# Patient Record
Sex: MALE | Race: WHITE | NOT HISPANIC OR LATINO | Employment: FULL TIME | URBAN - METROPOLITAN AREA
[De-identification: names, ages, dates, MRNs, and addresses within clinical notes are randomized per-mention and may not be internally consistent; named-entity substitution may affect disease eponyms.]

---

## 2017-02-01 ENCOUNTER — APPOINTMENT (OUTPATIENT)
Dept: CT IMAGING | Facility: CLINIC | Age: 32
End: 2017-02-01
Attending: EMERGENCY MEDICINE
Payer: COMMERCIAL

## 2017-02-01 ENCOUNTER — HOSPITAL ENCOUNTER (EMERGENCY)
Facility: CLINIC | Age: 32
Discharge: HOME | End: 2017-02-01
Attending: EMERGENCY MEDICINE | Admitting: EMERGENCY MEDICINE
Payer: COMMERCIAL

## 2017-02-01 VITALS
DIASTOLIC BLOOD PRESSURE: 79 MMHG | SYSTOLIC BLOOD PRESSURE: 147 MMHG | OXYGEN SATURATION: 94 % | RESPIRATION RATE: 16 BRPM | WEIGHT: 210 LBS | TEMPERATURE: 99.9 F | BODY MASS INDEX: 32.96 KG/M2 | HEART RATE: 94 BPM | HEIGHT: 67 IN

## 2017-02-01 DIAGNOSIS — R11.2 NAUSEA, VOMITING AND DIARRHEA: ICD-10-CM

## 2017-02-01 DIAGNOSIS — R31.29 MICROSCOPIC HEMATURIA: ICD-10-CM

## 2017-02-01 DIAGNOSIS — R10.31 ABDOMINAL PAIN, RIGHT LOWER QUADRANT: ICD-10-CM

## 2017-02-01 DIAGNOSIS — R19.7 NAUSEA, VOMITING AND DIARRHEA: ICD-10-CM

## 2017-02-01 LAB
ALBUMIN UR-MCNC: 30 MG/DL
ANION GAP SERPL CALCULATED.3IONS-SCNC: 10 MMOL/L (ref 3–14)
APPEARANCE UR: CLEAR
BASOPHILS # BLD AUTO: 0 10E9/L (ref 0–0.2)
BASOPHILS NFR BLD AUTO: 0.2 %
BILIRUB UR QL STRIP: NEGATIVE
BUN SERPL-MCNC: 19 MG/DL (ref 7–30)
CALCIUM SERPL-MCNC: 8.8 MG/DL (ref 8.5–10.1)
CHLORIDE SERPL-SCNC: 106 MMOL/L (ref 94–109)
CO2 SERPL-SCNC: 24 MMOL/L (ref 20–32)
COLOR UR AUTO: YELLOW
CREAT SERPL-MCNC: 0.86 MG/DL (ref 0.66–1.25)
DIFFERENTIAL METHOD BLD: NORMAL
EOSINOPHIL # BLD AUTO: 0.2 10E9/L (ref 0–0.7)
EOSINOPHIL NFR BLD AUTO: 3 %
ERYTHROCYTE [DISTWIDTH] IN BLOOD BY AUTOMATED COUNT: 12.8 % (ref 10–15)
GFR SERPL CREATININE-BSD FRML MDRD: ABNORMAL ML/MIN/1.7M2
GLUCOSE SERPL-MCNC: 109 MG/DL (ref 70–99)
GLUCOSE UR STRIP-MCNC: NEGATIVE MG/DL
HCT VFR BLD AUTO: 45.2 % (ref 40–53)
HGB BLD-MCNC: 15.2 G/DL (ref 13.3–17.7)
HGB UR QL STRIP: NEGATIVE
IMM GRANULOCYTES # BLD: 0 10E9/L (ref 0–0.4)
IMM GRANULOCYTES NFR BLD: 0.2 %
KETONES UR STRIP-MCNC: NEGATIVE MG/DL
LEUKOCYTE ESTERASE UR QL STRIP: NEGATIVE
LYMPHOCYTES # BLD AUTO: 0.8 10E9/L (ref 0.8–5.3)
LYMPHOCYTES NFR BLD AUTO: 10.4 %
MCH RBC QN AUTO: 29.1 PG (ref 26.5–33)
MCHC RBC AUTO-ENTMCNC: 33.6 G/DL (ref 31.5–36.5)
MCV RBC AUTO: 86 FL (ref 78–100)
MONOCYTES # BLD AUTO: 0.3 10E9/L (ref 0–1.3)
MONOCYTES NFR BLD AUTO: 3.7 %
MUCOUS THREADS #/AREA URNS LPF: PRESENT /LPF
NEUTROPHILS # BLD AUTO: 6.6 10E9/L (ref 1.6–8.3)
NEUTROPHILS NFR BLD AUTO: 82.5 %
NITRATE UR QL: NEGATIVE
NRBC # BLD AUTO: 0 10*3/UL
NRBC BLD AUTO-RTO: 0 /100
PH UR STRIP: 8 PH (ref 5–7)
PLATELET # BLD AUTO: 200 10E9/L (ref 150–450)
POTASSIUM SERPL-SCNC: 3.7 MMOL/L (ref 3.4–5.3)
RBC # BLD AUTO: 5.23 10E12/L (ref 4.4–5.9)
RBC #/AREA URNS AUTO: 6 /HPF (ref 0–2)
SODIUM SERPL-SCNC: 140 MMOL/L (ref 133–144)
SP GR UR STRIP: 1.03 (ref 1–1.03)
SQUAMOUS #/AREA URNS AUTO: <1 /HPF (ref 0–1)
URN SPEC COLLECT METH UR: ABNORMAL
UROBILINOGEN UR STRIP-MCNC: NORMAL MG/DL (ref 0–2)
WBC # BLD AUTO: 8.1 10E9/L (ref 4–11)
WBC #/AREA URNS AUTO: 1 /HPF (ref 0–2)

## 2017-02-01 PROCEDURE — 85025 COMPLETE CBC W/AUTO DIFF WBC: CPT | Performed by: EMERGENCY MEDICINE

## 2017-02-01 PROCEDURE — 81001 URINALYSIS AUTO W/SCOPE: CPT | Performed by: EMERGENCY MEDICINE

## 2017-02-01 PROCEDURE — 96375 TX/PRO/DX INJ NEW DRUG ADDON: CPT

## 2017-02-01 PROCEDURE — 99285 EMERGENCY DEPT VISIT HI MDM: CPT | Mod: 25

## 2017-02-01 PROCEDURE — 96361 HYDRATE IV INFUSION ADD-ON: CPT

## 2017-02-01 PROCEDURE — 25000132 ZZH RX MED GY IP 250 OP 250 PS 637: Performed by: EMERGENCY MEDICINE

## 2017-02-01 PROCEDURE — 74177 CT ABD & PELVIS W/CONTRAST: CPT

## 2017-02-01 PROCEDURE — 80048 BASIC METABOLIC PNL TOTAL CA: CPT | Performed by: EMERGENCY MEDICINE

## 2017-02-01 PROCEDURE — 96376 TX/PRO/DX INJ SAME DRUG ADON: CPT

## 2017-02-01 PROCEDURE — 96374 THER/PROPH/DIAG INJ IV PUSH: CPT

## 2017-02-01 PROCEDURE — 25000128 H RX IP 250 OP 636: Performed by: EMERGENCY MEDICINE

## 2017-02-01 PROCEDURE — 25500064 ZZH RX 255 OP 636: Performed by: EMERGENCY MEDICINE

## 2017-02-01 RX ORDER — IOPAMIDOL 755 MG/ML
500 INJECTION, SOLUTION INTRAVASCULAR ONCE
Status: COMPLETED | OUTPATIENT
Start: 2017-02-01 | End: 2017-02-01

## 2017-02-01 RX ORDER — MORPHINE SULFATE 4 MG/ML
4 INJECTION, SOLUTION INTRAMUSCULAR; INTRAVENOUS
Status: COMPLETED | OUTPATIENT
Start: 2017-02-01 | End: 2017-02-01

## 2017-02-01 RX ORDER — HYDROCODONE BITARTRATE AND ACETAMINOPHEN 5; 325 MG/1; MG/1
1-2 TABLET ORAL EVERY 4 HOURS PRN
Qty: 15 TABLET | Refills: 0 | Status: SHIPPED | OUTPATIENT
Start: 2017-02-01 | End: 2017-03-02

## 2017-02-01 RX ORDER — ONDANSETRON 4 MG/1
4 TABLET, ORALLY DISINTEGRATING ORAL EVERY 8 HOURS PRN
Qty: 10 TABLET | Refills: 0 | Status: SHIPPED | OUTPATIENT
Start: 2017-02-01 | End: 2017-02-04

## 2017-02-01 RX ORDER — SODIUM CHLORIDE 9 MG/ML
1000 INJECTION, SOLUTION INTRAVENOUS CONTINUOUS
Status: DISCONTINUED | OUTPATIENT
Start: 2017-02-01 | End: 2017-02-02 | Stop reason: HOSPADM

## 2017-02-01 RX ORDER — ONDANSETRON 2 MG/ML
4 INJECTION INTRAMUSCULAR; INTRAVENOUS
Status: COMPLETED | OUTPATIENT
Start: 2017-02-01 | End: 2017-02-01

## 2017-02-01 RX ORDER — HYDROCODONE BITARTRATE AND ACETAMINOPHEN 5; 325 MG/1; MG/1
2 TABLET ORAL ONCE
Status: COMPLETED | OUTPATIENT
Start: 2017-02-01 | End: 2017-02-01

## 2017-02-01 RX ORDER — LIDOCAINE 40 MG/G
CREAM TOPICAL
Status: DISCONTINUED | OUTPATIENT
Start: 2017-02-01 | End: 2017-02-02 | Stop reason: HOSPADM

## 2017-02-01 RX ORDER — CITALOPRAM HYDROBROMIDE 20 MG/1
20 TABLET ORAL DAILY
COMMUNITY

## 2017-02-01 RX ADMIN — SODIUM CHLORIDE 65 ML: 9 INJECTION, SOLUTION INTRAVENOUS at 21:44

## 2017-02-01 RX ADMIN — ONDANSETRON 4 MG: 2 INJECTION INTRAMUSCULAR; INTRAVENOUS at 23:20

## 2017-02-01 RX ADMIN — HYDROCODONE BITARTRATE AND ACETAMINOPHEN 2 TABLET: 5; 325 TABLET ORAL at 23:20

## 2017-02-01 RX ADMIN — SODIUM CHLORIDE 1000 ML: 9 INJECTION, SOLUTION INTRAVENOUS at 21:26

## 2017-02-01 RX ADMIN — ONDANSETRON 4 MG: 2 INJECTION INTRAMUSCULAR; INTRAVENOUS at 21:20

## 2017-02-01 RX ADMIN — IOPAMIDOL 100 ML: 755 INJECTION, SOLUTION INTRAVENOUS at 21:39

## 2017-02-01 RX ADMIN — MORPHINE SULFATE 4 MG: 4 INJECTION, SOLUTION INTRAMUSCULAR; INTRAVENOUS at 22:14

## 2017-02-01 RX ADMIN — MORPHINE SULFATE 4 MG: 4 INJECTION, SOLUTION INTRAMUSCULAR; INTRAVENOUS at 21:23

## 2017-02-01 RX ADMIN — MORPHINE SULFATE 4 MG: 4 INJECTION, SOLUTION INTRAMUSCULAR; INTRAVENOUS at 23:20

## 2017-02-01 ASSESSMENT — ENCOUNTER SYMPTOMS
CHILLS: 0
FEVER: 0
ABDOMINAL PAIN: 1
VOMITING: 1
DIARRHEA: 1
NAUSEA: 1

## 2017-02-01 NOTE — ED AVS SNAPSHOT
[facility] Emergency Department    [address]    [facility] [address]    Phone:  [phone]    Fax:  [phone]                                       Jignesh Hernandes   MRN: [id number]    Department:  [facility] Emergency Department   Date of Visit:  2/1/2017           Patient Information     Date Of Birth          1985        Your diagnoses for this visit were:     Nausea, vomiting and diarrhea     Abdominal pain, right lower quadrant     Microscopic hematuria        You were seen by Diamante Munroe MD.      Follow-up Information     Follow up with Clinic, [facility].    Why:  2-3 days as needed    Contact information:    [address] [facility] [id number] [phone]          Follow up with [facility] Emergency Department.    Specialty:  EMERGENCY MEDICINE    Why:  As needed, If symptoms worsen    Contact information:    [address] [facility] [id number] [phone]        Discharge Instructions       Discharge Instructions  Gastroenteritis    You have been seen today for vomiting and diarrhea, called gastroenteritis or the stomach flu. This is usually caused by a virus, but some bacteria, parasites, medicines or other medical conditions can cause similar symptoms. At this time your doctor does not find that your vomiting and diarrhea is a sign of anything dangerous or life-threatening.  However, sometimes the signs of serious illness do not show up right away.  If you have new or worse symptoms, you may need to be seen again in the Emergency Department or by your primary doctor. Remember that serious problems like appendicitis can look like gastroenteritis at first.       Return to the Emergency Department if:    You keep throwing up and you are not able to keep liquids down.    You feel you are getting dehydrated, such as being very thirsty, not urinating at least every 8-12 hours, or feeling  faint or lightheaded.     You develop a new fever, or your fever continues for more than 2 days.    You have belly pain that seems worse than cramps, is in one spot, or is getting worse over time.     You have blood in your vomit or in your diarrhea.    You feel very weak.    You are not starting to improve within 24 hours of your visit here.    What can I do to help myself?    The most important thing to do is to drink clear liquids.  If you have been vomiting a lot, it is best to have only small, frequent sips of liquids.  Drinking too much at once may cause more vomiting. If you are vomiting often, you must replace minerals, sodium and potassium lost with your illness. Pedialyte  and sports drinks can help you replace these minerals.  You can also drink clear liquids such as water, weak tea, apple juice, and 7-Up . Avoid acid liquids (orange), caffeine (coffee) or alcohol. Do not drink milk until you no longer have diarrhea.    After liquids are staying down, you may start eating mild foods. Soda crackers, toast, plain noodles, gelatin, applesauce and bananas are good first choices.  Avoid foods that have acid, are spicy, fatty or fibrous (such as meats, coarse grains, vegetables). You may start eating these foods again in about 3 days when you are better.    Sometimes treatment includes prescription medicine to prevent nausea and vomiting and to prevent diarrhea. If your doctor prescribes these for you, take them as directed.    Nonprescription medicine is available for the treatment of diarrhea and can be very effective.  If you use it, make sure you use the dose recommended on the package. Avoid Lomotil . Check with your healthcare provider before you use any medicine for diarrhea.    Don t take ibuprofen, or other nonsteroidal anti-inflammatory medicines without checking with your healthcare provider.  If you were given a prescription for medicine here today, be sure to read all of the information (including  the package insert) that comes with your prescription.  This will include important information about the medicine, its side effects, and any warnings that you need to know about.  The pharmacist who fills the prescription can provide more information and answer questions you may have about the medicine.  If you have questions or concerns that the pharmacist cannot address, please call or return to the Emergency Department.   Opioid Medication Information    Pain medications are among the most commonly prescribed medicines, so we are including this information for all our patients. If you did not receive pain medication or get a prescription for pain medicine, you can ignore it.     You may have been given a prescription for an opioid (narcotic) pain medicine and/or have received a pain medicine while here in the Emergency Department. These medicines can make you drowsy or impaired. You must not drive, operate dangerous equipment, or engage in any other dangerous activities while taking these medications. If you drive while taking these medications, you could be arrested for DUI, or driving under the influence. Do not drink any alcohol while you are taking these medications.     Opioid pain medications can cause addiction. If you have a history of chemical dependency of any type, you are at a higher risk of becoming addicted to pain medications.  Only take these prescribed medications to treat your pain when all other options have been tried. Take it for as short a time and as few doses as possible. Store your pain pills in a secure place, as they are frequently stolen and provide a dangerous opportunity for children or visitors in your house to start abusing these powerful medications. We will not replace any lost or stolen medicine.  As soon as your pain is better, you should flush all your remaining medication.     Many prescription pain medications contain Tylenol  (acetaminophen), including Vicodin , Tylenol  #3 , Norco , Lortab , and Percocet .  You should not take any extra pills of Tylenol  if you are using these prescription medications or you can get very sick.  Do not ever take more than 3000 mg of acetaminophen in any 24 hour period.    All opioids tend to cause constipation. Drink plenty of water and eat foods that have a lot of fiber, such as fruits, vegetables, prune juice, apple juice and high fiber cereal.  Take a laxative if you don t move your bowels at least every other day. Miralax , Milk of Magnesia, Colace , or Senna  can be used to keep you regular.      Remember that you can always come back to the Emergency Department if you are not able to see your regular doctor in the amount of time listed above, if you get any new symptoms, or if there is anything that worries you.    Discharge Instructions  Abdominal Pain    Abdominal pain can be caused by many things. Your evaluation today does not show the exact cause for your pain. Your doctor today has decided that it is unlikely your pain is due to a life threatening problem, or a problem requiring surgery or hospital admission. Sometimes those problems cannot be found right away, so it is very important that you follow up as directed.  Sometimes only the changes which occur over time allow the cause of your pain to be found.    Return to the Emergency Department for a recheck in 8-12 hours if your pain continues.  If your pain gets worse, changes in location, or feels different, return to the Emergency Department right away.    ADULTS:  Return to the Emergency Department right away if:      You get an oral temperature above 102oF or as directed by your doctor.    You have blood in your stools (bright red or black, tarry stools).    You keep throwing up or can t drink liquids.    You see blood when you throw up.    You can t have a bowel movement or you can t pass gas.    Your stomach gets bloated or bigger.    Your skin or the whites of your eyes look  yellow.    You faint.    You have bloody, frequent or painful urination.    You have new symptoms or anything that worries you.    CHILDREN:  Return to the Emergency Department right away if your child has any of the above-listed symptoms or the following:      Pushes your hand away or screams/cries when his/her belly is touched.    You notice your child is very fussy or weak.    Your child is very tired and is too tired to eat or drink.    Your child is dehydrated.  Signs of dehydration can be:  o Your infant has had no wet diapers in 4-5 hours.  o Your older child has not passed urine in 6-8 hours.  o Your infant or child starts to have dry mouth and lips, or no saliva or tears.    PREGNANT WOMEN:  Return to the Emergency Department right away if you have any of the above-listed symptoms or the following:      You have bleeding, leaking fluid or passing tissue from the vagina.    You have worse pain or cramping, or pain in your shoulder or back.    You have vomiting that will not stop.    You have painful or bloody urination.    You have a temperature of 100oF or more.    Your baby is not moving as much as usual.    You faint.    You get a bad headache with or without eye problems and abdominal pain.    You have a convulsion or seizure.    You have unusual discharge from your vagina and abdominal pain.    Abdominal pain is pretty common during pregnancy.  Your pain may or may not be related to your pregnancy. You should follow-up closely with your OB doctor so they can evaluate you and your baby.  Until you follow-up with your regular doctor, do the following:       Avoid sex and do not put anything in your vagina.    Drink clear fluids.    Only take medications approved by your doctor.    MORE INFORMATION:    Appendicitis:  A possible cause of abdominal pain in any person who still has their appendix is acute appendicitis. Appendicitis is often hard to diagnose.  Testing does not always rule out early  "appendicitis or other causes of abdominal pain. Close follow-up with your doctor and re-evaluations may be needed to figure out the reason for your abdominal pain.    Follow-up:  It is very important that you make an appointment with your clinic and go to the appointment.  If you do not follow-up with your primary doctor, it may result in missing an important development which could result in permanent injury or disability and/or lasting pain.  If there is any problem keeping your appointment, call your doctor or return to the Emergency Department.    Medications:  Take your medications as directed by your doctor today.  Before using over-the-counter medications, ask your doctor and make sure to take the medications as directed.  If you have any questions about medications, ask your doctor.    Diet:  Resume your normal diet as much as possible, but do not eat fried, fatty or spicy foods while you have pain.  Do not drink alcohol or have caffeine.  Do not smoke tobacco.    Probiotics: If you have been given an antibiotic, you may want to also take a probiotic pill or eat yogurt with live cultures. Probiotics have \"good bacteria\" to help your intestines stay healthy. Studies have shown that probiotics help prevent diarrhea and other intestine problems (including C. diff infection) when you take antibiotics. You can buy these without a prescription in the pharmacy section of the store.     If you were given a prescription for medicine here today, be sure to read all of the information (including the package insert) that comes with your prescription.  This will include important information about the medicine, its side effects, and any warnings that you need to know about.  The pharmacist who fills the prescription can provide more information and answer questions you may have about the medicine.  If you have questions or concerns that the pharmacist cannot address, please call or return to the Emergency Department. "         Remember that you can always come back to the Emergency Department if you are not able to see your regular doctor in the amount of time listed above, if you get any new symptoms, or if there is anything that worries you.          Discharge References/Attachments     [facility] (ENGLISH)      24 Hour Appointment Hotline       To make an appointment at any [facility], call [phone] ([phone]). If you don't have a family doctor or clinic, we will help you find one. [facility] clinics are conveniently located to serve the needs of you and your family.             Review of your medicines      START taking        Dose / Directions Last dose taken    HYDROcodone-acetaminophen 5-325 MG per tablet   Commonly known as:  NORCO   Dose:  1-2 tablet   Quantity:  15 tablet        Take 1-2 tablets by mouth every 4 hours as needed for moderate to severe pain   Refills:  0        ondansetron 4 MG ODT tab   Commonly known as:  ZOFRAN ODT   Dose:  4 mg   Quantity:  10 tablet        Take 1 tablet (4 mg) by mouth every 8 hours as needed for nausea   Refills:  0          Our records show that you are taking the medicines listed below. If these are incorrect, please call your family doctor or clinic.        Dose / Directions Last dose taken    citalopram 20 MG tablet   Commonly known as:  celeXA   Dose:  20 mg        Take 20 mg by mouth daily   Refills:  0                Prescriptions were sent or printed at these locations (2 Prescriptions)                   Other Prescriptions                Printed at Department/Unit printer (2 of 2)         ondansetron (ZOFRAN ODT) 4 MG ODT tab               HYDROcodone-acetaminophen (NORCO) 5-325 MG per tablet                Procedures and tests performed during your visit     Basic metabolic panel    CBC with platelets differential    CT Abdomen Pelvis w Contrast    Peripheral IV: Standard    UA with Microscopic      Orders Needing Specimen Collection     None      Pending  Results     No orders found from 1/31/2017 to 2/2/2017.            Pending Culture Results     No orders found from 1/31/2017 to 2/2/2017.       Test Results from your hospital stay           2/1/2017  9:02 PM - Interface, Flexilab Results      Component Results     Component Value Ref Range & Units Status    WBC 8.1 4.0 - 11.0 10e9/L Final    RBC Count 5.23 4.4 - 5.9 10e12/L Final    Hemoglobin 15.2 13.3 - 17.7 g/dL Final    Hematocrit 45.2 40.0 - 53.0 % Final    MCV 86 78 - 100 fl Final    MCH 29.1 26.5 - 33.0 pg Final    MCHC 33.6 31.5 - 36.5 g/dL Final    RDW 12.8 10.0 - 15.0 % Final    Platelet Count 200 150 - 450 10e9/L Final    Diff Method Automated Method  Final    % Neutrophils 82.5 % Final    % Lymphocytes 10.4 % Final    % Monocytes 3.7 % Final    % Eosinophils 3.0 % Final    % Basophils 0.2 % Final    % Immature Granulocytes 0.2 % Final    Nucleated RBCs 0 0 /100 Final    Absolute Neutrophil 6.6 1.6 - 8.3 10e9/L Final    Absolute Lymphocytes 0.8 0.8 - 5.3 10e9/L Final    Absolute Monocytes 0.3 0.0 - 1.3 10e9/L Final    Absolute Eosinophils 0.2 0.0 - 0.7 10e9/L Final    Absolute Basophils 0.0 0.0 - 0.2 10e9/L Final    Abs Immature Granulocytes 0.0 0 - 0.4 10e9/L Final    Absolute Nucleated RBC 0.0  Final         2/1/2017  9:17 PM - Interface, Flexilab Results      Component Results     Component Value Ref Range & Units Status    Sodium 140 133 - 144 mmol/L Final    Potassium 3.7 3.4 - 5.3 mmol/L Final    Chloride 106 94 - 109 mmol/L Final    Carbon Dioxide 24 20 - 32 mmol/L Final    Anion Gap 10 3 - 14 mmol/L Final    Glucose 109 (H) 70 - 99 mg/dL Final    Urea Nitrogen 19 7 - 30 mg/dL Final    Creatinine 0.86 0.66 - 1.25 mg/dL Final    GFR Estimate >90  Non  GFR Calc   >60 mL/min/1.7m2 Final    GFR Estimate If Black >90   GFR Calc   >60 mL/min/1.7m2 Final    Calcium 8.8 8.5 - 10.1 mg/dL Final         2/1/2017  9:36 PM - Interface, Flexilab Results      Component Results      Component Value Ref Range & Units Status    Color Urine Yellow  Final    Appearance Urine Clear  Final    Glucose Urine Negative NEG mg/dL Final    Bilirubin Urine Negative NEG Final    Ketones Urine Negative NEG mg/dL Final    Specific Gravity Urine 1.026 1.003 - 1.035 Final    Blood Urine Negative NEG Final    pH Urine 8.0 (H) 5.0 - 7.0 pH Final    Protein Albumin Urine 30 (A) NEG mg/dL Final    Urobilinogen mg/dL Normal 0.0 - 2.0 mg/dL Final    Nitrite Urine Negative NEG Final    Leukocyte Esterase Urine Negative NEG Final    Source Midstream Urine  Final    WBC Urine 1 0 - 2 /HPF Final    RBC Urine 6 (H) 0 - 2 /HPF Final    Squamous Epithelial /HPF Urine <1 0 - 1 /HPF Final    Mucous Urine Present (A) NEG /LPF Final         2/1/2017  9:52 PM - Interface, Radiant Ib      Narrative     CT ABDOMEN AND PELVIS WITH CONTRAST   2/1/2017 9:44 PM      HISTORY:  Right lower quadrant pain and cramping.    TECHNIQUE:  CT abdomen and pelvis with intravenous contrast. Radiation  dose for this scan was reduced using automated exposure control,  adjustment of the mA and/or kV according to patient size, or iterative  reconstruction technique. 100 mL Isovue-370     COMPARISON:  9/24/2007    FINDINGS:  Abdomen:  There is mild dependent atelectasis at the lung bases. The  heart size is normal. The liver, spleen, gallbladder, pancreas,  adrenal glands and kidneys are normal in appearance. There is no  abdominal or pelvic lymph node enlargement.    Pelvis: Bowel appears normal without obstruction or inflammation. The  appendix is normal. No free intraperitoneal gas or fluid.        Impression     IMPRESSION: No acute abnormality. No appendicitis or other bowel  inflammation or obstruction.    HOUSTON OLIVER MD                Clinical Quality Measure: Blood Pressure Screening     Your blood pressure was checked while you were in the emergency department today. The last reading we obtained was  BP: 143/80 mmHg . Please read the  "guidelines below about what these numbers mean and what you should do about them.  If your systolic blood pressure (the top number) is less than 120 and your diastolic blood pressure (the bottom number) is less than 80, then your blood pressure is normal. There is nothing more that you need to do about it.  If your systolic blood pressure (the top number) is 120-139 or your diastolic blood pressure (the bottom number) is 80-89, your blood pressure may be higher than it should be. You should have your blood pressure rechecked within a year by a primary care provider.  If your systolic blood pressure (the top number) is 140 or greater or your diastolic blood pressure (the bottom number) is 90 or greater, you may have high blood pressure. High blood pressure is treatable, but if left untreated over time it can put you at risk for heart attack, stroke, or kidney failure. You should have your blood pressure rechecked by a primary care provider within the next 4 weeks.  If your provider in the emergency department today gave you specific instructions to follow-up with your doctor or provider even sooner than that, you should follow that instruction and not wait for up to 4 weeks for your follow-up visit.        Thank you for choosing [facility]       Thank you for choosing [facility] for your care. Our goal is always to provide you with excellent care. Hearing back from our patients is one way we can continue to improve our services. Please take a few minutes to complete the written survey that you may receive in the mail after you visit with us. Thank you!        [employer] Information     [employer] lets you send messages to your doctor, view your test results, renew your prescriptions, schedule appointments and more. To sign up, go to [email or web address] . Click on \"Log in\" on the left side of the screen, which will take you to the Welcome page. Then click on \"Sign up Now\" on the right side of the page.     You will be " asked to enter the access code listed below, as well as some personal information. Please follow the directions to create your username and password.     Your access code is: [email or web address]  Expires: 2017 10:42 PM     Your access code will  in 90 days. If you need help or a new code, please call your [facility] clinic or [phone].        Care EveryWhere ID     This is your Care EveryWhere ID. This could be used by other organizations to access your [facility] medical records  [id number]        After Visit Summary       This is your record. Keep this with you and show to your community pharmacist(s) and doctor(s) at your next visit.

## 2017-02-01 NOTE — ED AVS SNAPSHOT
[facility] Emergency Department    [address]    [facility] [address]    Phone:  [phone]    Fax:  [phone]                                       Jignesh Hernandes   MRN: [id number]    Department:  [facility] Emergency Department   Date of Visit:  2/1/2017           After Visit Summary Signature Page     I have received my discharge instructions, and my questions have been answered. I have discussed any challenges I see with this plan with the nurse or doctor.    ..........................................................................................................................................  Patient/Patient Representative Signature      ..........................................................................................................................................  Patient Representative Print Name and Relationship to Patient    ..................................................               ................................................  Date                                            Time    ..........................................................................................................................................  Reviewed by Signature/Title    ...................................................              ..............................................  Date                                                            Time

## 2017-02-02 NOTE — ED NOTES
Started with dull abd pain today at work  Got home now with n/v/d   Low grade fever  Pain was periumbilical and now  Moved to right side  Rates pain rates pain 7/10  At times pain 10/10     Here for eval

## 2017-02-02 NOTE — ED PROVIDER NOTES
"  History     Chief Complaint:  Abdominal Pain     HPI    Jignesh Hernandes is a 31 year old male with a PMH significant for GERD who presents to the emergency department for evaluation of abdominal pain. The patient reports onset of abdominal pain at 1300 today accompanied by an episode of diarrhea. His pain was initially diffuse but has gradually migrated to his right lower quadrant and is now accompanied by nausea and vomiting. He rates his pain as a 7/10 in severity, adding that it intermittently worsens to a 10/10 \"spasm\" pain. The patient denies fevers or chills, as well as any blood in his vomit or stools. No known sick contacts or travel outside the country.    Allergies:  Amoxicillin    Medications:    Celexa     Past Medical History:    GERD   Anxiety    Past Surgical History:   [place] teeth extraction    Family History:   History reviewed. No pertinent family history.    Social History:   Tobacco use:    Former smoker who quit in 2004  Alcohol use:    Once a week  Marital status:    Single     Review of Systems   Constitutional: Negative for fever and chills.   Gastrointestinal: Positive for nausea, vomiting, abdominal pain and diarrhea.   All other systems reviewed and are negative.    Physical Exam   First Vitals:  BP: 131/79 mmHg  Pulse: 115  Temp: 99.9  F (37.7  C)  Resp: 18  Height: 170.2 cm (5' 7\")  Weight: 95.255 kg (210 lb)  SpO2: 94 %      Physical Exam  General: Adult male sitting upright.   Eyes: PERRL, Conjunctive within normal limits  ENT: Moist mucous membranes, oropharynx clear.   CV: Normal S1S2, no murmur, rub or gallop. Regular rate and rhythm  Resp: Clear to auscultation bilaterally, no wheezes, rales or rhonchi. Normal respiratory effort.  GI: Right lower quadrant tender to palpation. Abdomen is soft and nondistended. No palpable masses. No rebound or guarding.  MSK: No edema. Nontender. Normal active range of motion.  Skin: Warm and dry. No rashes or lesions or ecchymoses on visible " skin.  Neuro: Alert and oriented. Responds appropriately to all questions and commands. No focal findings appreciated. Normal muscle tone.  Psych: Normal mood and affect. Pleasant.    Emergency Department Course     Imaging:  Radiographic findings were communicated with the patient who voiced understanding of the findings.  CT abdomen/pelvis w/ contrast:  No acute abnormality. No appendicitis or other bowel inflammation or obstruction.  Radiology report.     Laboratory:  CBC: WNL (WBC 8.1, HGB 15.2, )   BMP: Glucose 109 high, o/w WNL (Creatinine 0.86)    UA: pH 8.0 high, protein albumin 30, RBC 6 high, mucous present, o/w negative    Emergency Department Course:  Nursing notes and vitals reviewed.   2052: I performed an exam of the patient as documented above.   The above workup was undertaken.   2120: Zofran 4 mg IV  2126: NS 1 L IV  Patient reassessed. Is feeling improved. Denies any new concerns. Able to tolerate po.  I personally reviewed the laboratory results with the Patient and answered all related questions prior to discharge.   Findings and plan explained to the Patient. Patient discharged home with instructions regarding supportive care, medications, and reasons to return. The importance of close follow-up was reviewed. The patient was prescribed Zofran and Norco.      Impression & Plan      Medical Decision Making:  Jignesh Hernandes is a 31 year old male who presents to the ED with concerns for nausea, vomiting, and diarrhea, as well as localized right lower quadrant abdominal pain that initially began in the periumbilical region. Obviously, concerns were for possible acute appendicitis. Also, possible etiologies were considered including gastroenteritis, renal colic, etc. The patient had a tiny number of RBCs in urine but clinically his symptoms seem unlikely to represent renal colic. CT scan did not show signs of appendicitis or other acute intraabdominal pathology such as hydronephrosis. His  symptoms have improved over time here and he is drinking fluid on reassessment with controlled pain. I feel comfortable discharging him home. This is likely early gastroenteritis, although again, other pathology cannot be excluded despite normal CT scan. He is recommended to return to the ED should symptoms worsen, he develops a fever, uncontrolled pain, uncontrolled vomiting, or any other concerns. All questions were answered prior to discharge.     Diagnosis:    ICD-10-CM    1. Nausea, vomiting and diarrhea R11.2     R19.7    2. Abdominal pain, right lower quadrant R10.31    3. Microscopic hematuria R31.29        Disposition:  Discharged to home.     Discharge Medications:  New Prescriptions    HYDROCODONE-ACETAMINOPHEN (NORCO) 5-325 MG PER TABLET    Take 1-2 tablets by mouth every 4 hours as needed for moderate to severe pain    ONDANSETRON (ZOFRAN ODT) 4 MG ODT TAB    Take 1 tablet (4 mg) by mouth every 8 hours as needed for nausea       IGuy, am serving as a scribe at 8:52 PM on 2/1/2017 to document services personally performed by Dr. Munroe, based on my observations and the provider's statements to me.     Guy Castillo  2/1/2017   [facility] EMERGENCY DEPARTMENT        Diamante Munroe MD  02/03/17 0801

## 2017-02-02 NOTE — DISCHARGE INSTRUCTIONS
Discharge Instructions  Gastroenteritis    You have been seen today for vomiting and diarrhea, called gastroenteritis or the stomach flu. This is usually caused by a virus, but some bacteria, parasites, medicines or other medical conditions can cause similar symptoms. At this time your doctor does not find that your vomiting and diarrhea is a sign of anything dangerous or life-threatening.  However, sometimes the signs of serious illness do not show up right away.  If you have new or worse symptoms, you may need to be seen again in the Emergency Department or by your primary doctor. Remember that serious problems like appendicitis can look like gastroenteritis at first.       Return to the Emergency Department if:    You keep throwing up and you are not able to keep liquids down.    You feel you are getting dehydrated, such as being very thirsty, not urinating at least every 8-12 hours, or feeling faint or lightheaded.     You develop a new fever, or your fever continues for more than 2 days.    You have belly pain that seems worse than cramps, is in one spot, or is getting worse over time.     You have blood in your vomit or in your diarrhea.    You feel very weak.    You are not starting to improve within 24 hours of your visit here.    What can I do to help myself?    The most important thing to do is to drink clear liquids.  If you have been vomiting a lot, it is best to have only small, frequent sips of liquids.  Drinking too much at once may cause more vomiting. If you are vomiting often, you must replace minerals, sodium and potassium lost with your illness. Pedialyte  and sports drinks can help you replace these minerals.  You can also drink clear liquids such as water, weak tea, apple juice, and 7-Up . Avoid acid liquids (orange), caffeine (coffee) or alcohol. Do not drink milk until you no longer have diarrhea.    After liquids are staying down, you may start eating mild foods. Soda crackers, toast, plain  noodles, gelatin, applesauce and bananas are good first choices.  Avoid foods that have acid, are spicy, fatty or fibrous (such as meats, coarse grains, vegetables). You may start eating these foods again in about 3 days when you are better.    Sometimes treatment includes prescription medicine to prevent nausea and vomiting and to prevent diarrhea. If your doctor prescribes these for you, take them as directed.    Nonprescription medicine is available for the treatment of diarrhea and can be very effective.  If you use it, make sure you use the dose recommended on the package. Avoid Lomotil . Check with your healthcare provider before you use any medicine for diarrhea.    Don t take ibuprofen, or other nonsteroidal anti-inflammatory medicines without checking with your healthcare provider.  If you were given a prescription for medicine here today, be sure to read all of the information (including the package insert) that comes with your prescription.  This will include important information about the medicine, its side effects, and any warnings that you need to know about.  The pharmacist who fills the prescription can provide more information and answer questions you may have about the medicine.  If you have questions or concerns that the pharmacist cannot address, please call or return to the Emergency Department.   Opioid Medication Information    Pain medications are among the most commonly prescribed medicines, so we are including this information for all our patients. If you did not receive pain medication or get a prescription for pain medicine, you can ignore it.     You may have been given a prescription for an opioid (narcotic) pain medicine and/or have received a pain medicine while here in the Emergency Department. These medicines can make you drowsy or impaired. You must not drive, operate dangerous equipment, or engage in any other dangerous activities while taking these medications. If you drive  while taking these medications, you could be arrested for DUI, or driving under the influence. Do not drink any alcohol while you are taking these medications.     Opioid pain medications can cause addiction. If you have a history of chemical dependency of any type, you are at a higher risk of becoming addicted to pain medications.  Only take these prescribed medications to treat your pain when all other options have been tried. Take it for as short a time and as few doses as possible. Store your pain pills in a secure place, as they are frequently stolen and provide a dangerous opportunity for children or visitors in your house to start abusing these powerful medications. We will not replace any lost or stolen medicine.  As soon as your pain is better, you should flush all your remaining medication.     Many prescription pain medications contain Tylenol  (acetaminophen), including Vicodin , Tylenol #3 , Norco , Lortab , and Percocet .  You should not take any extra pills of Tylenol  if you are using these prescription medications or you can get very sick.  Do not ever take more than 3000 mg of acetaminophen in any 24 hour period.    All opioids tend to cause constipation. Drink plenty of water and eat foods that have a lot of fiber, such as fruits, vegetables, prune juice, apple juice and high fiber cereal.  Take a laxative if you don t move your bowels at least every other day. Miralax , Milk of Magnesia, Colace , or Senna  can be used to keep you regular.      Remember that you can always come back to the Emergency Department if you are not able to see your regular doctor in the amount of time listed above, if you get any new symptoms, or if there is anything that worries you.    Discharge Instructions  Abdominal Pain    Abdominal pain can be caused by many things. Your evaluation today does not show the exact cause for your pain. Your doctor today has decided that it is unlikely your pain is due to a life  threatening problem, or a problem requiring surgery or hospital admission. Sometimes those problems cannot be found right away, so it is very important that you follow up as directed.  Sometimes only the changes which occur over time allow the cause of your pain to be found.    Return to the Emergency Department for a recheck in 8-12 hours if your pain continues.  If your pain gets worse, changes in location, or feels different, return to the Emergency Department right away.    ADULTS:  Return to the Emergency Department right away if:      You get an oral temperature above 102oF or as directed by your doctor.    You have blood in your stools (bright red or black, tarry stools).    You keep throwing up or can t drink liquids.    You see blood when you throw up.    You can t have a bowel movement or you can t pass gas.    Your stomach gets bloated or bigger.    Your skin or the whites of your eyes look yellow.    You faint.    You have bloody, frequent or painful urination.    You have new symptoms or anything that worries you.    CHILDREN:  Return to the Emergency Department right away if your child has any of the above-listed symptoms or the following:      Pushes your hand away or screams/cries when his/her belly is touched.    You notice your child is very fussy or weak.    Your child is very tired and is too tired to eat or drink.    Your child is dehydrated.  Signs of dehydration can be:  o Your infant has had no wet diapers in 4-5 hours.  o Your older child has not passed urine in 6-8 hours.  o Your infant or child starts to have dry mouth and lips, or no saliva or tears.    PREGNANT WOMEN:  Return to the Emergency Department right away if you have any of the above-listed symptoms or the following:      You have bleeding, leaking fluid or passing tissue from the vagina.    You have worse pain or cramping, or pain in your shoulder or back.    You have vomiting that will not stop.    You have painful or bloody  urination.    You have a temperature of 100oF or more.    Your baby is not moving as much as usual.    You faint.    You get a bad headache with or without eye problems and abdominal pain.    You have a convulsion or seizure.    You have unusual discharge from your vagina and abdominal pain.    Abdominal pain is pretty common during pregnancy.  Your pain may or may not be related to your pregnancy. You should follow-up closely with your OB doctor so they can evaluate you and your baby.  Until you follow-up with your regular doctor, do the following:       Avoid sex and do not put anything in your vagina.    Drink clear fluids.    Only take medications approved by your doctor.    MORE INFORMATION:    Appendicitis:  A possible cause of abdominal pain in any person who still has their appendix is acute appendicitis. Appendicitis is often hard to diagnose.  Testing does not always rule out early appendicitis or other causes of abdominal pain. Close follow-up with your doctor and re-evaluations may be needed to figure out the reason for your abdominal pain.    Follow-up:  It is very important that you make an appointment with your clinic and go to the appointment.  If you do not follow-up with your primary doctor, it may result in missing an important development which could result in permanent injury or disability and/or lasting pain.  If there is any problem keeping your appointment, call your doctor or return to the Emergency Department.    Medications:  Take your medications as directed by your doctor today.  Before using over-the-counter medications, ask your doctor and make sure to take the medications as directed.  If you have any questions about medications, ask your doctor.    Diet:  Resume your normal diet as much as possible, but do not eat fried, fatty or spicy foods while you have pain.  Do not drink alcohol or have caffeine.  Do not smoke tobacco.    Probiotics: If you have been given an antibiotic, you may  "want to also take a probiotic pill or eat yogurt with live cultures. Probiotics have \"good bacteria\" to help your intestines stay healthy. Studies have shown that probiotics help prevent diarrhea and other intestine problems (including C. diff infection) when you take antibiotics. You can buy these without a prescription in the pharmacy section of the store.     If you were given a prescription for medicine here today, be sure to read all of the information (including the package insert) that comes with your prescription.  This will include important information about the medicine, its side effects, and any warnings that you need to know about.  The pharmacist who fills the prescription can provide more information and answer questions you may have about the medicine.  If you have questions or concerns that the pharmacist cannot address, please call or return to the Emergency Department.         Remember that you can always come back to the Emergency Department if you are not able to see your regular doctor in the amount of time listed above, if you get any new symptoms, or if there is anything that worries you.        "

## 2017-03-02 ENCOUNTER — HOSPITAL ENCOUNTER (EMERGENCY)
Facility: CLINIC | Age: 32
Discharge: HOME | End: 2017-03-02
Attending: EMERGENCY MEDICINE | Admitting: EMERGENCY MEDICINE
Payer: COMMERCIAL

## 2017-03-02 VITALS
OXYGEN SATURATION: 100 % | DIASTOLIC BLOOD PRESSURE: 88 MMHG | RESPIRATION RATE: 20 BRPM | HEART RATE: 80 BPM | SYSTOLIC BLOOD PRESSURE: 146 MMHG | TEMPERATURE: 97.6 F

## 2017-03-02 DIAGNOSIS — F41.0 ANXIETY ATTACK: ICD-10-CM

## 2017-03-02 DIAGNOSIS — F41.9 ANXIETY: ICD-10-CM

## 2017-03-02 LAB — INTERPRETATION ECG - MUSE: NORMAL

## 2017-03-02 PROCEDURE — 99283 EMERGENCY DEPT VISIT LOW MDM: CPT

## 2017-03-02 PROCEDURE — 25000132 ZZH RX MED GY IP 250 OP 250 PS 637: Performed by: EMERGENCY MEDICINE

## 2017-03-02 PROCEDURE — 93005 ELECTROCARDIOGRAM TRACING: CPT

## 2017-03-02 RX ORDER — LORAZEPAM 1 MG/1
1 TABLET ORAL 3 TIMES DAILY PRN
Qty: 10 TABLET | Refills: 0 | Status: ON HOLD | OUTPATIENT
Start: 2017-03-02 | End: 2017-11-30

## 2017-03-02 RX ORDER — LORAZEPAM 1 MG/1
1 TABLET ORAL ONCE
Status: COMPLETED | OUTPATIENT
Start: 2017-03-02 | End: 2017-03-02

## 2017-03-02 RX ADMIN — LORAZEPAM 1 MG: 1 TABLET ORAL at 11:47

## 2017-03-02 ASSESSMENT — ENCOUNTER SYMPTOMS
NAUSEA: 0
VOMITING: 0
DIAPHORESIS: 1
PALPITATIONS: 1
NERVOUS/ANXIOUS: 1

## 2017-03-02 NOTE — ED AVS SNAPSHOT
[facility] Emergency Department    [address]    [facility] [address]    Phone:  [phone]    Fax:  [phone]                                       Jignesh Hernandes   MRN: [id number]    Department:  [facility] Emergency Department   Date of Visit:  3/2/2017           After Visit Summary Signature Page     I have received my discharge instructions, and my questions have been answered. I have discussed any challenges I see with this plan with the nurse or doctor.    ..........................................................................................................................................  Patient/Patient Representative Signature      ..........................................................................................................................................  Patient Representative Print Name and Relationship to Patient    ..................................................               ................................................  Date                                            Time    ..........................................................................................................................................  Reviewed by Signature/Title    ...................................................              ..............................................  Date                                                            Time

## 2017-03-02 NOTE — DISCHARGE INSTRUCTIONS
Please make an appointment to follow up with your primary care provider in 2-3 days for recheck and to consider seeing a therapist for depression and anxiety.        Treating Anxiety Disorders with Medication  An anxiety disorder can make you feel nervous or apprehensive, even without a clear reason. Certain anxiety disorders can cause intense feelings of fear or panic. You may even have physical symptoms, such as a racing heartbeat or dizziness. If you have these feelings, you don t have to suffer anymore. Treatment to help you overcome your fears will likely include therapy (also called counseling). Medication may also be prescribed to help control your symptoms.    Medications  Certain medications may be prescribed to help control your symptoms. As a result, you may feel less anxious. You may also feel able to move forward with therapy. At first, medications and dosages may need to be adjusted to find what works best for you. Try to be patient. Tell your health care provider how a medication makes you feel. This way, you can work together to find the treatment that s best for you. Keep in mind that medications can have side effects. Talk to your provider about any side effects that are bothering you. Changing the dose or type of medication may help. Don t stop taking medication on your own because it can cause symptoms to come back.    Anti-anxiety medication: This medication eases symptoms and helps you relax. Your health care provider will explain when and how to use it. It may be prescribed for use before situations that makes you anxious. Or, you may be told to take it on a regular schedule. Anti-anxiety medication may make you feel a little sleepy or  out of it.  Don t drive a car or operate machinery while on this medication, until you know how it affects you.  Caution  Never use alcohol or other drugs with anti-anxiety medications. This could result in loss of muscular control, sedation, coma or death.  Also, use only the amount of medication prescribed for you. If you think you may have taken too much, get emergency care right away.     Antidepressant medication: This kind of medication is often used to treat anxiety, even if you aren t depressed. An antidepressant helps balance out brain chemicals. This helps keep anxiety under control. This medication is taken on a schedule. It takes a few weeks to start working. If you don t notice a change at first, you may just need more time. But if you don t notice results after the first few weeks, tell your provider.  Keep taking medications as prescribed  Never change your dosage or stop taking your medications without talking to your health care provider first. Keep the following in mind:    Some medications must be taken on a schedule. Make this part of your daily routine. For instance, always take your pill before brushing your teeth. A pillbox can help you remember if you ve taken your medication each day.    Medications are often taken for 6 to 12 months. Your health care provider will then evaluate whether you need to stay on them. Many people who have also had therapy may no longer need medication to manage anxiety.    You may need to stop taking medication slowly to give your body time to adjust. When it s time to stop, your health care provider will tell you more. Remember: Never stop taking your medication without talking to your provider first.    If symptoms return, you may need to start taking medications again. This isn t your fault. It s just the nature of your anxiety disorder.  Special concerns    Side effects: Medications may cause side effects. Ask your health care provider or pharmacist what you can expect. They may have ideas for avoiding some side effects.    Sexual problems: Some antidepressants can affect your desire for sex or your ability to have an orgasm. A change in dosage or medication often solves the problem. If you have a sexual side  effect that concerns you, tell your health care provider.    Addiction: Antidepressants are not addictive. And if you ve never had a problem with drugs or alcohol, you likely won t have a problem with anti-anxiety medication. But if you have history of addiction, you may need to avoid this medication.     2747-0280 [employer]. [address] [place] [address], [place] [address]. All rights reserved. This information is not intended as a substitute for professional medical care. Always follow your healthcare professional's instructions.          Anxiety Reaction  Anxiety is the feeling we all get when we think something bad might happen. It is a normal response to stress and usually causes only a mild reaction. When anxiety becomes more severe, it can interfere with daily life. In some cases, you may not even be aware of what it is you re anxious about. There may also be a genetic link or it may be a learned behavior in the home.  Both psychological and physical triggers cause stress reaction. It's often a response to fear or emotional stress, real or imagined. This stress may come from home, family, work, or social relationships.  During an anxiety reaction, you may feel:    Helpless    Nervous    Depressed    Irritable  Your body may show signs of anxiety in many ways. You may experience:    Dry mouth    Shakiness    Dizziness    Weakness    Trouble breathing    Breathing fast (hyperventilating)    Chest pressure    Sweating    Headache    Nausea    Diarrhea    Tiredness    Inability to sleep    Sexual problems  Home care    Try to locate the sources of stress in your life. They may not be obvious. These may include:    Daily hassles of life (traffic jams, missed appointments, car troubles, etc.)    Major life changes, both good (new baby, job promotion) and bad (loss of job, loss of loved one)    Overload: feeling that you have too many responsibilities and can't take care of all of them at once    Feeling  helpless, feeling that your problems are beyond what you re able to solve    Notice how your body reacts to stress. Learn to listen to your body signals. This will help you take action before the stress becomes severe.    When you can, do something about the source of your stress. (Avoid hassles, limit the amount of change that happens in your life at one time and take a break when you feel overloaded).    Unfortunately, many stressful situations can't be avoided. It is necessary to learn how to better manage stress. There are many proven methods that will reduce your anxiety. These include simple things like exercise, good nutrition and adequate rest. Also, there are certain techniques that are helpful:    Relaxation    Breathing exercises    Visualization    Biofeedback    Meditation  For more information about this, consult your doctor or go to a local bookstore and review the many books and tapes available on this subject.  Follow-up care  If you feel that your anxiety is not responding to self-help measures, contact your doctor or make an appointment with a counselor. You may need short-term psychological counseling and temporary medicine to help you manage stress.  Call 911  Call your healthcare provider right away if any of these occur:    Trouble breathing    Confusion    Drowsiness or trouble wakening    Fainting or loss of consciousness    Rapid heart rate    Seizure    New chest pain that becomes more severe, lasts longer, or spreads into your shoulder, arm, neck, jaw, or back  When to seek medical advice  Call your healthcare provider right away if any of these occur:    Your symptoms get worse    Severe headache not relieved by rest and mild pain reliever    5507-1247 [employer]. [address] [place] [address], [place] [id number]. All rights reserved. This information is not intended as a substitute for professional medical care. Always follow your healthcare professional's instructions.

## 2017-03-02 NOTE — ED NOTES
Script rx lorazapam given to pt at time of d/c  A/O. VSS.  Pt verbalized understanding of d/c instructions and ambulated to lobby steady gait.

## 2017-03-02 NOTE — ED AVS SNAPSHOT
[facility] Emergency Department    [address] [place] [address]    Phone:  [phone]    Fax:  [phone]                                       Jignesh Hernandes   MRN: [id number]    Department:  [facility] Emergency Department   Date of Visit:  3/2/2017           Patient Information     Date Of Birth          1985        Your diagnoses for this visit were:     Anxiety     Anxiety attack        You were seen by Zac Meyer MD.        Discharge Instructions       Please make an appointment to follow up with your primary care provider in 2-3 days for recheck and to consider seeing a therapist for depression and anxiety.        Treating Anxiety Disorders with Medication  An anxiety disorder can make you feel nervous or apprehensive, even without a clear reason. Certain anxiety disorders can cause intense feelings of fear or panic. You may even have physical symptoms, such as a racing heartbeat or dizziness. If you have these feelings, you don t have to suffer anymore. Treatment to help you overcome your fears will likely include therapy (also called counseling). Medication may also be prescribed to help control your symptoms.    Medications  Certain medications may be prescribed to help control your symptoms. As a result, you may feel less anxious. You may also feel able to move forward with therapy. At first, medications and dosages may need to be adjusted to find what works best for you. Try to be patient. Tell your health care provider how a medication makes you feel. This way, you can work together to find the treatment that s best for you. Keep in mind that medications can have side effects. Talk to your provider about any side effects that are bothering you. Changing the dose or type of medication may help. Don t stop taking medication on your own because it can cause symptoms to come back.    Anti-anxiety medication: This medication eases symptoms  and helps you relax. Your health care provider will explain when and how to use it. It may be prescribed for use before situations that makes you anxious. Or, you may be told to take it on a regular schedule. Anti-anxiety medication may make you feel a little sleepy or  out of it.  Don t drive a car or operate machinery while on this medication, until you know how it affects you.  Caution  Never use alcohol or other drugs with anti-anxiety medications. This could result in loss of muscular control, sedation, coma or death. Also, use only the amount of medication prescribed for you. If you think you may have taken too much, get emergency care right away.     Antidepressant medication: This kind of medication is often used to treat anxiety, even if you aren t depressed. An antidepressant helps balance out brain chemicals. This helps keep anxiety under control. This medication is taken on a schedule. It takes a few weeks to start working. If you don t notice a change at first, you may just need more time. But if you don t notice results after the first few weeks, tell your provider.  Keep taking medications as prescribed  Never change your dosage or stop taking your medications without talking to your health care provider first. Keep the following in mind:    Some medications must be taken on a schedule. Make this part of your daily routine. For instance, always take your pill before brushing your teeth. A pillbox can help you remember if you ve taken your medication each day.    Medications are often taken for 6 to 12 months. Your health care provider will then evaluate whether you need to stay on them. Many people who have also had therapy may no longer need medication to manage anxiety.    You may need to stop taking medication slowly to give your body time to adjust. When it s time to stop, your health care provider will tell you more. Remember: Never stop taking your medication without talking to your provider  first.    If symptoms return, you may need to start taking medications again. This isn t your fault. It s just the nature of your anxiety disorder.  Special concerns    Side effects: Medications may cause side effects. Ask your health care provider or pharmacist what you can expect. They may have ideas for avoiding some side effects.    Sexual problems: Some antidepressants can affect your desire for sex or your ability to have an orgasm. A change in dosage or medication often solves the problem. If you have a sexual side effect that concerns you, tell your health care provider.    Addiction: Antidepressants are not addictive. And if you ve never had a problem with drugs or alcohol, you likely won t have a problem with anti-anxiety medication. But if you have history of addiction, you may need to avoid this medication.     7944-8831 [employer]. [address] [place] [address]. All rights reserved. This information is not intended as a substitute for professional medical care. Always follow your healthcare professional's instructions.          Anxiety Reaction  Anxiety is the feeling we all get when we think something bad might happen. It is a normal response to stress and usually causes only a mild reaction. When anxiety becomes more severe, it can interfere with daily life. In some cases, you may not even be aware of what it is you re anxious about. There may also be a genetic link or it may be a learned behavior in the home.  Both psychological and physical triggers cause stress reaction. It's often a response to fear or emotional stress, real or imagined. This stress may come from home, family, work, or social relationships.  During an anxiety reaction, you may feel:    Helpless    Nervous    Depressed    Irritable  Your body may show signs of anxiety in many ways. You may experience:    Dry mouth    Shakiness    Dizziness    Weakness    Trouble breathing    Breathing fast  (hyperventilating)    Chest pressure    Sweating    Headache    Nausea    Diarrhea    Tiredness    Inability to sleep    Sexual problems  Home care    Try to locate the sources of stress in your life. They may not be obvious. These may include:    Daily hassles of life (traffic jams, missed appointments, car troubles, etc.)    Major life changes, both good (new baby, job promotion) and bad (loss of job, loss of loved one)    Overload: feeling that you have too many responsibilities and can't take care of all of them at once    Feeling helpless, feeling that your problems are beyond what you re able to solve    Notice how your body reacts to stress. Learn to listen to your body signals. This will help you take action before the stress becomes severe.    When you can, do something about the source of your stress. (Avoid hassles, limit the amount of change that happens in your life at one time and take a break when you feel overloaded).    Unfortunately, many stressful situations can't be avoided. It is necessary to learn how to better manage stress. There are many proven methods that will reduce your anxiety. These include simple things like exercise, good nutrition and adequate rest. Also, there are certain techniques that are helpful:    Relaxation    Breathing exercises    Visualization    Biofeedback    Meditation  For more information about this, consult your doctor or go to a local bookstore and review the many books and tapes available on this subject.  Follow-up care  If you feel that your anxiety is not responding to self-help measures, contact your doctor or make an appointment with a counselor. You may need short-term psychological counseling and temporary medicine to help you manage stress.  Call 911  Call your healthcare provider right away if any of these occur:    Trouble breathing    Confusion    Drowsiness or trouble wakening    Fainting or loss of consciousness    Rapid heart rate    Seizure    New  chest pain that becomes more severe, lasts longer, or spreads into your shoulder, arm, neck, jaw, or back  When to seek medical advice  Call your healthcare provider right away if any of these occur:    Your symptoms get worse    Severe headache not relieved by rest and mild pain reliever    8475-4284 [employer]. [address] [place] [address]. All rights reserved. This information is not intended as a substitute for professional medical care. Always follow your healthcare professional's instructions.          24 Hour Appointment Hotline       To make an appointment at any [facility], call [phone] ([phone]). If you don't have a family doctor or clinic, we will help you find one. [facility] clinics are conveniently located to serve the needs of you and your family.             Review of your medicines      START taking        Dose / Directions Last dose taken    LORazepam 1 MG tablet   Commonly known as:  ATIVAN   Dose:  1 mg   Quantity:  10 tablet        Take 1 tablet (1 mg) by mouth 3 times daily as needed for anxiety   Refills:  0          Our records show that you are taking the medicines listed below. If these are incorrect, please call your family doctor or clinic.        Dose / Directions Last dose taken    citalopram 20 MG tablet   Commonly known as:  celeXA   Dose:  20 mg        Take 20 mg by mouth daily   Refills:  0                Prescriptions were sent or printed at these locations (1 Prescription)                   Other Prescriptions                Printed at Department/Unit printer (1 of 1)         LORazepam (ATIVAN) 1 MG tablet                Procedures and tests performed during your visit     EKG 12-lead, tracing only      Orders Needing Specimen Collection     None      Pending Results     No orders found from 2/28/2017 to 3/3/2017.            Pending Culture Results     No orders found from 2/28/2017 to 3/3/2017.             Test Results from your  hospital stay            Clinical Quality Measure: Blood Pressure Screening     Your blood pressure was checked while you were in the emergency department today. The last reading we obtained was  BP: 146/88 . Please read the guidelines below about what these numbers mean and what you should do about them.  If your systolic blood pressure (the top number) is less than 120 and your diastolic blood pressure (the bottom number) is less than 80, then your blood pressure is normal. There is nothing more that you need to do about it.  If your systolic blood pressure (the top number) is 120-139 or your diastolic blood pressure (the bottom number) is 80-89, your blood pressure may be higher than it should be. You should have your blood pressure rechecked within a year by a primary care provider.  If your systolic blood pressure (the top number) is 140 or greater or your diastolic blood pressure (the bottom number) is 90 or greater, you may have high blood pressure. High blood pressure is treatable, but if left untreated over time it can put you at risk for heart attack, stroke, or kidney failure. You should have your blood pressure rechecked by a primary care provider within the next 4 weeks.  If your provider in the emergency department today gave you specific instructions to follow-up with your doctor or provider even sooner than that, you should follow that instruction and not wait for up to 4 weeks for your follow-up visit.        Thank you for choosing [facility]       Thank you for choosing [facility] for your care. Our goal is always to provide you with excellent care. Hearing back from our patients is one way we can continue to improve our services. Please take a few minutes to complete the written survey that you may receive in the mail after you visit with us. Thank you!        [employer] Information     [employer] lets you send messages to your doctor, view your test results, renew your prescriptions, schedule appointments  "and more. To sign up, go to [email or web address] . Click on \"Log in\" on the left side of the screen, which will take you to the Welcome page. Then click on \"Sign up Now\" on the right side of the page.     You will be asked to enter the access code listed below, as well as some personal information. Please follow the directions to create your username and password.     Your access code is: [email or web address]  Expires: 2017 10:42 PM     Your access code will  in 90 days. If you need help or a new code, please call your [facility] clinic or [phone].        Care EveryWhere ID     This is your Care EveryWhere ID. This could be used by other organizations to access your [facility] medical records  [id number]        After Visit Summary       This is your record. Keep this with you and show to your community pharmacist(s) and doctor(s) at your next visit.                  "

## 2017-03-02 NOTE — ED NOTES
In Triage: ABC's intact. Alert and oriented x 3.  Pt reports anxiety that is much worse today than usual. Denies thoughts of harming self or others. States he smoked marijuana last night to try to calm down but otherwise has not done this for years.

## 2017-03-02 NOTE — ED PROVIDER NOTES
History     Chief Complaint:  Anxiety    HPI   Jignesh Hernandes is a 31 year old male with a history of anxiety who presents to the emergency department today for evaluation of anxiety. Lately the patient has been worrying more than usual. This episode has been the worst ever for him, and he attributes this to his work being more hectic lately with more deadlines. 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 The patient has reported some palpitations and diaphoresis on his palms but no nausea or vomiting. He currently takes Celexa for his anxiety but in the past has stopped it himself before restarting it again for the last couple of months. The patient denies any suicidal or homicidal ideation. He has never done therapy for his anxiety.     Allergies:  Amoxicillin     Medications:    Celexa    Past Medical History:    GERD  Anxiety    Past Surgical History:    History reviewed. No pertinent past surgical history.     Family History:    History reviewed. No pertinent family history.      Social History:  The patient was accompanied to the ED by wife.  Smoking Status: former smoker  Alcohol Use: negative   Marital Status:   [2]    Review of Systems   Constitutional: Positive for diaphoresis.   Cardiovascular: Positive for palpitations.   Gastrointestinal: Negative for nausea and vomiting.   Psychiatric/Behavioral: Negative for suicidal ideas. The patient is nervous/anxious.    All other systems reviewed and are negative.    Physical Exam   Vitals:  Patient Vitals for the past 24 hrs:   BP Temp Temp src Pulse Resp SpO2   03/02/17 1014 146/88 97.6  F (36.4  C) Temporal 88 20 96 %       Physical Exam   HENT:   Right Ear: External ear normal.   Left Ear: External ear normal.   Nose: Nose normal.   Eyes: Conjunctivae and lids are normal.   Neck: Neck supple. No tracheal deviation present.   Cardiovascular: Regular rhythm and intact distal  pulses.    Pulmonary/Chest: Breath sounds normal. No respiratory distress.   Abdominal: Soft. There is no tenderness. There is no rebound and no guarding.   Musculoskeletal:   No peripheral edema   Neurological:   MAEE, no gross focal motor or sensory deficit   Skin: Skin is warm and dry. He is not diaphoretic.   Psychiatric: His mood appears anxious. Thought content is not paranoid. He expresses no homicidal and no suicidal ideation.   Nursing note and vitals reviewed.        Emergency Department Course     ECG:  ECG taken at 1111, ECG read at 1116  Normal sinus rhythm  Normal ECG  Rate 81 bpm. MI interval 144. QRS duration 96. QT/QTc 364/422. P-R-T axes 43 43 20.     Interventions:  1147 Ativan 1 mg oral      Emergency Department Course:  Nursing notes and vitals reviewed.  I performed an exam of the patient as documented above.   I discussed the treatment plan with the patient. They expressed understanding of this plan and consented to discharge. They will be discharged home with instructions for care and follow up. In addition, the patient will return to the emergency department if their symptoms persist, worsen, if new symptoms arise or if there is any concern.  All questions were answered.   I personally reviewed the results with the Patient and answered all related questions prior to discharge.    Impression & Plan      Medical Decision Making:  Jignesh Hernandes is a 31 year old male here with likely generalized anxiety disorder and anxiety attacks. He is describing some mild sense of palpitations, low suspicion for cardiogenic etiology. I doubt he has hyperthyroid state, ACS, or significant risk for sudden cardiac death. We will treat as anxiety disorder. EKG with no red flags. He was given Ativan here with discharge home with Ativan. I recommended he talk to his PCP about starting therapy for depression and anxiety. No suicidal or homicidal ideation.     Diagnosis:    ICD-10-CM    1. Anxiety F41.9    2.  Anxiety attack F41.0      Disposition:   Discharge    Discharge Medications:  New Prescriptions    LORAZEPAM (ATIVAN) 1 MG TABLET    Take 1 tablet (1 mg) by mouth 3 times daily as needed for anxiety     Scribe Disclosure:  I, Quinn Dean, am serving as a scribe at 10:21 AM on 3/2/2017 to document services personally performed by Zac Meyer, *, based on my observations and the provider's statements to me.   3/2/2017   [facility] EMERGENCY DEPARTMENT       Zac Meyer MD  03/02/17 1445

## 2017-11-29 ENCOUNTER — HOSPITAL ENCOUNTER (INPATIENT)
Facility: CLINIC | Age: 32
LOS: 1 days | Discharge: HOME | DRG: 885 | End: 2017-11-30
Attending: EMERGENCY MEDICINE | Admitting: INTERNAL MEDICINE
Payer: COMMERCIAL

## 2017-11-29 DIAGNOSIS — T14.91XA SUICIDE ATTEMPT (H): ICD-10-CM

## 2017-11-29 DIAGNOSIS — Z65.9 OTHER SOCIAL STRESSOR: ICD-10-CM

## 2017-11-29 DIAGNOSIS — F32.A DEPRESSION, UNSPECIFIED DEPRESSION TYPE: ICD-10-CM

## 2017-11-29 DIAGNOSIS — T42.4X2A INTENTIONAL BENZODIAZEPINE OVERDOSE, INITIAL ENCOUNTER (H): ICD-10-CM

## 2017-11-29 DIAGNOSIS — F41.1 GAD (GENERALIZED ANXIETY DISORDER): ICD-10-CM

## 2017-11-29 DIAGNOSIS — F33.2 SEVERE RECURRENT MAJOR DEPRESSION WITHOUT PSYCHOTIC FEATURES (H): Primary | ICD-10-CM

## 2017-11-29 LAB
ALBUMIN SERPL-MCNC: 4 G/DL (ref 3.4–5)
ALP SERPL-CCNC: 60 U/L (ref 40–150)
ALT SERPL W P-5'-P-CCNC: 123 U/L (ref 0–70)
AMPHETAMINES UR QL SCN: NEGATIVE
ANION GAP SERPL CALCULATED.3IONS-SCNC: 8 MMOL/L (ref 3–14)
APAP SERPL-MCNC: <2 MG/L (ref 10–20)
AST SERPL W P-5'-P-CCNC: 50 U/L (ref 0–45)
BARBITURATES UR QL: NEGATIVE
BASOPHILS # BLD AUTO: 0 10E9/L (ref 0–0.2)
BASOPHILS NFR BLD AUTO: 0.5 %
BENZODIAZ UR QL: NEGATIVE
BILIRUB SERPL-MCNC: 0.3 MG/DL (ref 0.2–1.3)
BUN SERPL-MCNC: 14 MG/DL (ref 7–30)
CALCIUM SERPL-MCNC: 8.8 MG/DL (ref 8.5–10.1)
CANNABINOIDS UR QL SCN: NEGATIVE
CHLORIDE SERPL-SCNC: 104 MMOL/L (ref 94–109)
CO2 SERPL-SCNC: 26 MMOL/L (ref 20–32)
COCAINE UR QL: NEGATIVE
CREAT SERPL-MCNC: 0.99 MG/DL (ref 0.66–1.25)
DIFFERENTIAL METHOD BLD: NORMAL
EOSINOPHIL # BLD AUTO: 0.6 10E9/L (ref 0–0.7)
EOSINOPHIL NFR BLD AUTO: 6.6 %
ERYTHROCYTE [DISTWIDTH] IN BLOOD BY AUTOMATED COUNT: 13.4 % (ref 10–15)
ETHANOL SERPL-MCNC: <0.01 G/DL
GFR SERPL CREATININE-BSD FRML MDRD: 88 ML/MIN/1.7M2
GLUCOSE SERPL-MCNC: 133 MG/DL (ref 70–99)
HCT VFR BLD AUTO: 43.8 % (ref 40–53)
HGB BLD-MCNC: 14.8 G/DL (ref 13.3–17.7)
IMM GRANULOCYTES # BLD: 0 10E9/L (ref 0–0.4)
IMM GRANULOCYTES NFR BLD: 0.5 %
INTERPRETATION ECG - MUSE: NORMAL
LYMPHOCYTES # BLD AUTO: 2.6 10E9/L (ref 0.8–5.3)
LYMPHOCYTES NFR BLD AUTO: 29.7 %
MCH RBC QN AUTO: 29.7 PG (ref 26.5–33)
MCHC RBC AUTO-ENTMCNC: 33.8 G/DL (ref 31.5–36.5)
MCV RBC AUTO: 88 FL (ref 78–100)
MONOCYTES # BLD AUTO: 0.7 10E9/L (ref 0–1.3)
MONOCYTES NFR BLD AUTO: 7.7 %
NEUTROPHILS # BLD AUTO: 4.8 10E9/L (ref 1.6–8.3)
NEUTROPHILS NFR BLD AUTO: 55 %
OPIATES UR QL SCN: NEGATIVE
PCP UR QL SCN: NEGATIVE
PLATELET # BLD AUTO: 236 10E9/L (ref 150–450)
POTASSIUM SERPL-SCNC: 3.6 MMOL/L (ref 3.4–5.3)
PROT SERPL-MCNC: 7.9 G/DL (ref 6.8–8.8)
RBC # BLD AUTO: 4.98 10E12/L (ref 4.4–5.9)
SALICYLATES SERPL-MCNC: <2 MG/DL
SODIUM SERPL-SCNC: 138 MMOL/L (ref 133–144)
TSH SERPL DL<=0.005 MIU/L-ACNC: 3.03 MU/L (ref 0.4–4)
WBC # BLD AUTO: 8.7 10E9/L (ref 4–11)

## 2017-11-29 PROCEDURE — 80307 DRUG TEST PRSMV CHEM ANLYZR: CPT | Performed by: EMERGENCY MEDICINE

## 2017-11-29 PROCEDURE — 99223 1ST HOSP IP/OBS HIGH 75: CPT | Mod: AI | Performed by: NURSE PRACTITIONER

## 2017-11-29 PROCEDURE — 12400006 ZZH R&B MH INTERMEDIATE

## 2017-11-29 PROCEDURE — 80320 DRUG SCREEN QUANTALCOHOLS: CPT | Performed by: EMERGENCY MEDICINE

## 2017-11-29 PROCEDURE — 25000132 ZZH RX MED GY IP 250 OP 250 PS 637: Performed by: PSYCHIATRY & NEUROLOGY

## 2017-11-29 PROCEDURE — 84443 ASSAY THYROID STIM HORMONE: CPT | Performed by: EMERGENCY MEDICINE

## 2017-11-29 PROCEDURE — 80329 ANALGESICS NON-OPIOID 1 OR 2: CPT | Performed by: EMERGENCY MEDICINE

## 2017-11-29 PROCEDURE — 99285 EMERGENCY DEPT VISIT HI MDM: CPT | Mod: 25

## 2017-11-29 PROCEDURE — 93005 ELECTROCARDIOGRAM TRACING: CPT

## 2017-11-29 PROCEDURE — 85025 COMPLETE CBC W/AUTO DIFF WBC: CPT | Performed by: EMERGENCY MEDICINE

## 2017-11-29 PROCEDURE — 84443 ASSAY THYROID STIM HORMONE: CPT | Performed by: PSYCHIATRY & NEUROLOGY

## 2017-11-29 PROCEDURE — 90791 PSYCH DIAGNOSTIC EVALUATION: CPT

## 2017-11-29 PROCEDURE — 80053 COMPREHEN METABOLIC PANEL: CPT | Performed by: EMERGENCY MEDICINE

## 2017-11-29 RX ORDER — ACETAMINOPHEN 500 MG
1000 TABLET ORAL EVERY 6 HOURS PRN
Status: DISCONTINUED | OUTPATIENT
Start: 2017-11-29 | End: 2017-11-30 | Stop reason: HOSPADM

## 2017-11-29 RX ORDER — QUETIAPINE FUMARATE 25 MG/1
25-50 TABLET, FILM COATED ORAL
Status: DISCONTINUED | OUTPATIENT
Start: 2017-11-29 | End: 2017-11-30 | Stop reason: HOSPADM

## 2017-11-29 RX ORDER — DOCUSATE SODIUM 100 MG/1
100 CAPSULE, LIQUID FILLED ORAL 2 TIMES DAILY PRN
Status: DISCONTINUED | OUTPATIENT
Start: 2017-11-29 | End: 2017-11-30 | Stop reason: HOSPADM

## 2017-11-29 RX ORDER — MIRTAZAPINE 7.5 MG/1
7.5 TABLET, FILM COATED ORAL AT BEDTIME
Status: DISCONTINUED | OUTPATIENT
Start: 2017-11-29 | End: 2017-11-30

## 2017-11-29 RX ORDER — POLYETHYLENE GLYCOL 3350 17 G/17G
17 POWDER, FOR SOLUTION ORAL DAILY PRN
Status: DISCONTINUED | OUTPATIENT
Start: 2017-11-29 | End: 2017-11-30 | Stop reason: HOSPADM

## 2017-11-29 RX ORDER — CITALOPRAM HYDROBROMIDE 20 MG/1
20 TABLET ORAL DAILY
Status: DISCONTINUED | OUTPATIENT
Start: 2017-11-29 | End: 2017-11-30 | Stop reason: HOSPADM

## 2017-11-29 RX ORDER — HYDROXYZINE HYDROCHLORIDE 25 MG/1
25-50 TABLET, FILM COATED ORAL EVERY 4 HOURS PRN
Status: DISCONTINUED | OUTPATIENT
Start: 2017-11-29 | End: 2017-11-30 | Stop reason: HOSPADM

## 2017-11-29 RX ADMIN — CITALOPRAM HYDROBROMIDE 20 MG: 20 TABLET ORAL at 09:18

## 2017-11-29 RX ADMIN — MIRTAZAPINE 7.5 MG: 7.5 TABLET, FILM COATED ORAL at 20:03

## 2017-11-29 RX ADMIN — QUETIAPINE FUMARATE 50 MG: 25 TABLET, FILM COATED ORAL at 10:37

## 2017-11-29 ASSESSMENT — ACTIVITIES OF DAILY LIVING (ADL)
ORAL_HYGIENE: INDEPENDENT
GROOMING: INDEPENDENT

## 2017-11-29 NOTE — ED PROVIDER NOTES
"  History     Chief Complaint:  Drug Overdose     HPI   Jignesh Hernandes is a 32 year old male with history of depression and anxiety who presents to the emergency department today accompanied by his mother for evaluation of drug overdose as a suicide attempt.  According to the patient he took 25 0.5 mg of Ativan at 1600 to commit suicide. If the overdose did not \"work\" he intended to kill himself with car exhaust or stabbing himself. He states he wrote a suicide note and left them for his family.  His mother found the note and transported him to the ED.  He feels \"sedated\" but denies nausea or any other physical symptoms. He is going through a divorce and lost his house which has been very stressful and has full custody of his 3 children.  He currently lives with his parents and 3 children. He states that he is a moderately heavy alcohol drinker, and his last drink was 2 days ago.  Today charges were filed against him with respect to a restraining order which he says put him over the edge and prompted the suicide attempt.    Allergies:  Amoxicillin    Medications:    LORazepam (ATIVAN) 1 MG tablet  citalopram (CELEXA) 20 MG tablet    Past Medical History:    Gastroesophageal reflux disease    Past Surgical History:    History reviewed. No pertinent surgical history.    Family History:    Family history reviewed. No pertinent family history.     Social History:  The patient was accompanied to the ED by his mom.  Smoking Status: Former Smoker; Quit 9/24/2004  Smokeless Tobacco: Never Used  Alcohol Use: Positive  Marital Status:       Review of Systems   Psychiatric/Behavioral: Positive for suicidal ideas.   All other systems reviewed and are negative.    Physical Exam     Patient Vitals for the past 24 hrs:   BP Temp Temp src Heart Rate Resp SpO2 Height Weight   11/29/17 0230 125/79 - - 81 22 92 % - -   11/29/17 0200 127/68 - - 90 21 92 % - -   11/29/17 0130 122/75 - - 87 19 95 % - -   11/29/17 0115 123/81 - - 88 " "20 96 % - -   11/29/17 0016 132/87 97.4  F (36.3  C) Temporal 111 18 94 % 1.702 m (5' 7\") 95.3 kg (210 lb)     Physical Exam  SKIN:  Warm, dry.  No signes of self inflicted trauma.  HEMATOLOGIC/IMMUNOLOGIC/LYMPHATIC:  No pallor.  HENT:  Moist oral mucosa.  EYES:  Conjunctivae normal.  Anicteric.  CARDIOVASCULAR:  Tachycardic rate and regular rhythm.  No murmur.  RESPIRATORY:  No respiratory distress, breath sounds equal and normal.  GASTROINTESTINAL:  Soft, nontender abdomen.  NEUROLOGIC:  Somnolent, conversant.  PSYCHIATRIC:  Depressed mood and bland affect.  Poor eye contact.  No psychotic features.    Emergency Department Course     ECG:  ECG taken at 0036, ECG read at 0051  Sinus tachycardia  Incomplete right bundle branch block  Borderline ECG  Rate 102 bpm. IA interval 140 ms. QRS duration 96 ms. QT/QTc 356/463 ms. P-R-T axes 45 52 38.    Laboratory:  Laboratory findings were communicated with the patient who voiced understanding of the findings.    CBC: WBC 8.7, HGB 14.8,   CMP: Glucose (Collected 0030) 133 (H),  (H), AST 50 (H) o/w WNL (Creatinine 0.99)  Salicylate level: <2  Acetaminophen level: <2  Alcohol ethyl: <0.01  Drug abuse screen 77 urine: Negative     Emergency Department Course:    Nursing notes and vitals reviewed.    0030 I performed an exam of the patient as documented above.     IV was inserted and blood was drawn for laboratory testing, results above.     Salicylate level, acetaminophen level, alcohol ethyl, and drug abuse screens were initiated, results above.    0050 I discussed the patient's case with Poison Control Center. They states that the toxicity level of the patient's drug ingestion would have peaked after 3 hours. He is therefore cleared for further evaluation as needed.    0140 I discussed the patient's case with central intake for a possible admission bed.     0415 I discussed the patient's case with Dr. Saleh from behavioral health who agreed to admit the " patient for further evaluation and care.    I personally reviewed the EKG and lab results with the patient and answered all related questions prior to admission.    I discussed the treatment plan with the patient. They expressed understanding of this plan and consented to admission. I discussed the patient with Dr. Saleh, who will admit the patient to a monitored bed for further evaluation and treatment.     Impression & Plan      Medical Decision Making:  Jignesh Hernandes is a 32 year old male who presents to the emergency department today for evaluation of suicidal ideation after a suicide attempt by overdose. I thought the patient was appropriate for admission and medical screening was performed as above. After discussion with the Poison Control Center, they considered him medically clear as the benzodiazepine that he took would have peaked 3 hours after the ingestion. That was a number of hours ago. He remain to be easily awakened to voice and was ultimately accepted here for admission at [facility] by Dr. Saleh.    Diagnosis:    ICD-10-CM    1. Suicide attempt T14.91XA Comprehensive metabolic panel     Salicylate level     Acetaminophen level     Alcohol ethyl     Drug abuse screen 77 urine ([place],,)   2. Intentional benzodiazepine overdose, initial encounter (H) T42.4X2A    3. Other social stressor Z65.9    4. Depression, unspecified depression type F32.9    5. YVONNE (generalized anxiety disorder) F41.1      Disposition:   The patient is admitted into the care of Dr. Saleh.     Scribe Disclosure:  I, Estela Olvera, am serving as a scribe at 12:29 AM on 11/29/2017 to document services personally performed by Prabhjot Enriquez MD, based on my observations and the provider's statements to me.       EMERGENCY DEPARTMENT       Prabhjot Enriquez MD  11/30/17 0014

## 2017-11-29 NOTE — PLAN OF CARE
Problem: Depressive Symptoms  Goal: Depressive Symptoms  Signs and symptoms of listed problems will be absent or manageable.  1. Mood stability   2. Absence of suicidal ideation/contracts for safety   3. Depressive s/sx resolved  4. Safety plan in place  5. Positive coping skills identified/utilized  6. Medication regiment established/compliance  7. Adequate sleep  8. Housing community support  9. F/u plan in place     Outcome: No Change  Patient met with doctor and with  today.  Did not go to groups and slept through lunch.  Did not want to speak with writer.  States that he just wanted to sleep.  Spent the afternoon sleeping in the bed.  Patient required seroquel for anxiety and then napped the rest of the day.

## 2017-11-29 NOTE — H&P
Case Management Psycho-Social Assessment    This information has been obtained from the patient's chart and from a personal interview with the patient.     Reason for Admission: Admitted to hospital after overdose on Ativan.    Previous Mental & Chemical Health: No previous mental health hospitalizations. Did see a marriage counselor for 4 sessions.   Long pattern of alcohol use- 5-6 drinks @ least 3 x/ week. Has had 2 DWI's. Denies other drug use, though did use marijuana late teens- quit at age 22. Is willing to quit drinking and go through treatment.    Family History:   Grew up in [place], the 3rd of 3 brothers in an intact family. Parents, Alek and Melanie, have long used alcohol- no treatment history.   Patient has been  and is now  from [place]. The couple has 3 children, a 10 year old daughter and 7 and 5 year old sin. Children live with patient. His mother is caring for them while he is in hospital. Patient reports ex-wife is a cocaine addict. She has a restraining order against him.  Patient denies history of abuse or trauma.    Current Living Situation:   Lives in parents' house in [place] with his 3 children.    Education and Work History:  Graduated from [place] [employer] in 2003. Graduated from [employer] program in 2004. 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 Is now in school for .  Raised Sikh- currently non- practicing.  No  service.  Enjoys fishing, summer or winter. Also enjoys his children's activities.    Insurance:  [employer].    Legal Issues :    Ex-wife has restraining order against him. Court date for assault charges filed by ex-wife on 12/11/17. He is anxious about the upcoming hearing.       Assessment Needs & Plan:  Patient  Is awaiting chemical health assessment. He is willing to go through treatment and is willing to seek counseling.

## 2017-11-29 NOTE — IP AVS SNAPSHOT
MRN:[id number]                      After Visit Summary   11/29/2017    Jignesh Hernandes    MRN: [id number]           Thank you!     Thank you for choosing [facility] for your care. Our goal is always to provide you with excellent care.        Patient Information     Date Of Birth          1985        Designated Caregiver       Most Recent Value    Caregiver    Will someone help with your care after discharge? yes    Name of designated caregiver family    Phone number of caregiver family    Caregiver address family      About your hospital stay     You were admitted on:  November 29, 2017 You last received care in the:  [facility]    You were discharged on:  November 30, 2017       Who to Call     For medical emergencies, please call 911.  For non-urgent questions about your medical care, please call your primary care provider or clinic, [phone]          Attending Provider     Provider Specialty    Prabhjot Enriquez MD Emergency Medicine    [facility], Kanchan Turk MD Psychiatry    Troy Azevedo MD Psychiatry       Primary Care Provider Office Phone # Fax #    [facility] [phone]      Further instructions from your care team       Behavioral Discharge Planning and Instructions    Summary: Admitted to hospital after suicide attempt by overdose    Main Diagnosis: Major depression; Anxiety disorder, NOS; Alcohol use disorder     Major Treatments, Procedures and Findings: psychiatric assessment; chemical health assessment    Symptoms to Report: feeling more aggressive, mood getting worse or thoughts of suicide    Lifestyle Adjustment: Sober lifestyle recommended. Complete treatment and follow up with AA and sponsor. Follow up with medication management, treatment, and then counseling counseling to assist with anxiety and depression management.    Psychiatry Follow-up:     Dr. Azevedo  - appointment on Friday 12/15/17 @  "1:45 pm.      [facility]  [address], [address]  [place]  [address]  Phone:  [phone]  Fax:  [phone]    Parents to watch you take Antabuse.    [facility]  [phone]  [place] [address]  [phone]    Questions about chemical health referral- call Ping RayFRANCISCO @ [phone]    Resources:   [facility] Crisis Services- 454.343.7292  Crisis Intervention: 117.518.7050 or 750-323-8480 (TTY: 171.696.3683).  Call anytime for help.  National [place] on Mental Illness (www.[place].robert.org): 605.492.8614 or 212-197-8016.  Alcoholics Anonymous (www.alcoholics-anonymous.org): Check your phone book for your local chapter.  National Suicide Prevention Line (www.mentalhealthmn.org): 309-349-NDAV (0259)    General Medication Instructions:   See your medication sheet(s) for instructions.   Take all medicines as directed.  Make no changes unless your doctor suggests them.   Go to all your doctor visits.  Be sure to have all your required lab tests. This way, your medicines can be refilled on time.  Do not use any drugs not prescribed by your doctor.  Avoid alcohol.      Pending Results     No orders found for last 3 day(s).            Statement of Approval     Ordered          11/30/17 2484  I have reviewed and agree with all the recommendations and orders detailed in this document.  EFFECTIVE NOW     Approved and electronically signed by:  Troy Azevedo MD             Admission Information     Date & Time Provider Department Dept. Phone    11/29/2017 Troy Azevedo MD [facility] [phone]      Your Vitals Were     Blood Pressure Pulse Temperature Respirations Height Weight    133/73 106 98.2  F (36.8  C) (Oral) 16 1.702 m (5' 7\") 99.7 kg (219 lb 11.2 oz)    Pulse Oximetry BMI (Body Mass Index)                97% 34.41 kg/m2          MyChart Information     [facility] lets you " "send messages to your doctor, view your test results, renew your prescriptions, schedule appointments and more. To sign up, go to [email or web address] . Click on \"Log in\" on the left side of the screen, which will take you to the Welcome page. Then click on \"Sign up Now\" on the right side of the page.     You will be asked to enter the access code listed below, as well as some personal information. Please follow the directions to create your username and password.     Your access code is: [email or web address]  Expires: 2018  4:33 PM     Your access code will  in 90 days. If you need help or a new code, please call your [facility] clinic or [phone].        Care EveryWhere ID     This is your Care EveryWhere ID. This could be used by other organizations to access your [facility] medical records  [id number]        Equal Access to Services     NICOLAS BLAKE : Tavo Willis, wasmita baires, qamilan kaalmada prabhakar, marylou cervantes. So [facility] [phone].    ATENCIÓN: Si habla español, tiene a clarke disposición servicios gratuitos de asistencia lingüística. Hi al [phone].    We comply with applicable federal civil rights laws and [place] laws. We do not discriminate on the basis of race, color, national origin, age, disability, sex, sexual orientation, or gender identity.               Review of your medicines      START taking        Dose / Directions    disulfiram 250 MG tablet   Commonly known as:  ANTABUSE   Used for:  Severe recurrent major depression without psychotic features (H)        Dose:  250 mg   Take 1 tablet (250 mg) by mouth daily   Quantity:  30 tablet   Refills:  0       mirtazapine 15 MG tablet   Commonly known as:  REMERON   Used for:  Severe recurrent major depression without psychotic features (H)        Dose:  15 mg   Take 1 tablet (15 mg) by mouth At Bedtime   Quantity:  30 tablet   Refills:  0         CONTINUE these medicines which have " NOT CHANGED        Dose / Directions    citalopram 20 MG tablet   Commonly known as:  celeXA        Dose:  20 mg   Take 20 mg by mouth daily   Refills:  0         STOP taking     LORazepam 1 MG tablet   Commonly known as:  ATIVAN                Where to get your medicines      These medications were sent to [id number] - [place], [place] - [address] AT [facility] [address]  [address], [facility] [address]     Phone:  [phone]     disulfiram 250 MG tablet    mirtazapine 15 MG tablet                Protect others around you: Learn how to safely use, store and throw away your medicines at www.disposemymeds.org.             Medication List: This is a list of all your medications and when to take them. Check marks below indicate your daily home schedule. Keep this list as a reference.      Medications           Morning Afternoon Evening Bedtime As Needed    citalopram 20 MG tablet   Commonly known as:  celeXA   Take 20 mg by mouth daily   Last time this was given:  20 mg on 11/30/2017  8:26 AM                                disulfiram 250 MG tablet   Commonly known as:  ANTABUSE   Take 1 tablet (250 mg) by mouth daily   Last time this was given:  250 mg on 11/30/2017  5:14 PM                                mirtazapine 15 MG tablet   Commonly known as:  REMERON   Take 1 tablet (15 mg) by mouth At Bedtime   Last time this was given:  7.5 mg on 11/29/2017  8:03 PM

## 2017-11-29 NOTE — PROGRESS NOTES
11/29/17 0452   Patient Belongings   Did you bring any home meds/supplements to the hospital?  No   Patient Belongings clothing;shoes   Disposition of Belongings Locker   Belongings Search Yes   Clothing Search Yes   Second Staff Jasmyne     Shoes with laces Jeans  t-shirt  Underwear  Pair of socks  [place] Drivers license  Insurance card  [employer] rewards card    Security envelope #[id number]  VISA [id number]    Admission:  I am responsible for any personal items that are not sent to the safe or pharmacy. [facility] is not responsible for loss, theft or damage of any property in my possession.        Patient Signature: ___________________________________________      Date/Time:__________________________     Staff Signature: __________________________________      Date/Time:__________________________     [facility] Staff person, if patient is unable/unwilling to sign:      __________________________________________________________      Date/Time: __________________________        Discharge:  [facility] has returned all of my personal belongings:     Patient Signature: ________________________________________     Date/Time: ____________________________________     Staff Signature: ______________________________________     Date/Time:_____________________________________

## 2017-11-29 NOTE — PROGRESS NOTES
32 year old male came to 77 after overdosing on  12.5mg of Ativan and attempting to suicide by carbon monoxide. He was also planning on cutting himself.  Pt has been feeling suicidal x 2 weeks. Stressors: recently  and havw a dificult time being a full time parent to 3 children(ages 10, 7, 5).  Pt said he lost their house by not having his ex-wifes income. He has been drinking 5-6 beers 3x's/week.  Pt said his ex-wife has been manipulative.  Most recently she filed a police report stating that he put a gun to her head.  Pt denied doing that but he is concerned he will end up in long term that he thought he would be better off dead.   During the admission pt was very depressed and hopeless.  He said he was still severely suicidal and he doesn't have much hope that things will get better.    Nursing assessment complete including patient and medication profiles. Risk assessments completed addressing suicide,fall,skin,nutrition and safety issues. Care plan initiated. Assessments reviewed with physician and admit orders received. Welcome packet reviewed with patient. Information reviewed includes getting emergency help, preventing infections, understanding your care, using medication safely, reducing falls, preventing pressure ulcers, smoking cessation, powerful choices and Patients Bill of Rights. Pt. given tour of the unit and instruction on use of facility including emergency call light. Program schedule reviewed with patient. Questions regarding the unit addressed. Pt. Search completed and belongings inventoried.

## 2017-11-29 NOTE — H&P
[facility]    History and Physical  Hospitalist       Date of Admission:  11/29/2017    Assessment & Plan   Jignesh Hernandes is a 32 year old male who presented to [facility] Emergency Department on 11/29/2017 after taking 12.5- mg PO Ativan at 4: 00 PM on 11/28/2017 in a suicide attempt. He is currently admitted to inpatient Adult Mental Health on a 72- hour hold.    Overdose, benzodiazepine, Ativan 12.5- mg 4: 00 PM 11/28/2017  Depression  Anxiety  Mr. Hernandes was admitted to [facility] Adult Mental Health after a intentional benzodiazepine overdose. Per EMR review he also intended to use carbon monoxide poisoning and/or stab himself if the overdose did not work. A suicide note was also found with him. He is on a 72- hour hold.  - full plan of care per Psychiatry  - monitor for benzodiazepine withdrawal    Alcohol use, 5-6 drinks 3 times/week  Elevated transaminase, , AST 50  Mr. Hernandes has been drinking above amount recently. He states his last drink was Sunday. He denies ever experiencing signs of withdrawal or withdrawal seizure. Per EMR review it does not appear he has had previous elevation. He is not currently taking hepatotoxic medications. Mild elevation is likely related to recent heavy alcohol use.  - monitor for signs of withdrawal   - highly recommend alcohol cessation   - recommend follow-up with PCP upon discharge, no compelling reason to work-up further acutely     Gastroesophageal reflux, treated  He endorses occasional reflux. Had EGD recently which revealed eosinophilia esophagitis. Does not take daily medication.  - PRN PPI ordered     Concern for chronic kidney disease, GFR 88  Per EMR review calculated GFR in April 2017 was 93. Denies daily use of NSAIDS, hypertension, and familial kidney disease. He has endorsed recent heavy alcohol use and decreased oral hydration; this could be secondary to dehydration.  - recommend adequate oral intake  - avoid nephrotoxic agents, renal dosing of  medications as appropriate  - no NSAIDS, including ibuprofen and aspirin  - Tylenol if needed for headache, mild pain, or fever  - avoid hypotension   - recommend follow-up with PCP after discharge; no compelling reason to work-up acutely     Intermittent postural dizziness  Stroke-like symptoms, resolved, March 2017  He endorses a work-up for stroke-like symptoms at [facility] in March 2017. Per EMR review work-up included echocardiogram, stress test, and ZioPatch; all work-up was unremarkable. EKG in the ED with normal QTc, sinus tachycardia, and likely incomplete right bundle branch block.   - monitor; currently asymptomatic  - if has symptoms recommend orthostatic vital signs    The above assessment and plan has been discussed with Dr. Keith, who is in agreement with the above assessment and plan. The Hospitalist Service will sign-off. Please call us with additional questions or concerns.    DVT Prophylaxis: Ambulate every shift  Code Status: Full Code    Disposition: Per Psychiatry    LINA Hills, CNP  Hospitalist Service, House Officer  [facility]     Text Page  Pager: [phone]    Primary Care Physician   [facility]    Chief Complaint   Intentional overdose    History is obtained from the patient.    History of Present Illness   Jignesh Hernandes is a 32 year old male who presented to [facility] Emergency Department on 11/29/2017 after taking an intentional overdose of 12.5- mg Ativan at 4:00 PM 11/28/2017. He had additional plans of carbon monoxide poisoning and stabbing himself; a suicide note to his family was found. He was placed on a 72- hour hold and admitted to Inpatient Adult Mental Health.    Emergency Department course included EKG with normal QTc, sinus tachycardia, and likely incomplete right bundle branch block; GFR of 88, , AST 50, and negative toxicology.     At this time he offers no specific complaints.    Past Medical History    I personally  reviewed history with patient:  - anxiety and depression, treated  - GERD, not treated  - eosinophilia esophagitis  - stroke-like symptoms, March 2017  - intermittent positional dizziness    Past Surgical History   I personally reviewed history with patient:  - no surgeries     Prior to Admission Medications   Prior to Admission Medications   Prescriptions Last Dose Informant Patient Reported? Taking?   LORazepam (ATIVAN) 1 MG tablet 11/28/2017 at Unknown time  No Yes   Sig: Take 1 tablet (1 mg) by mouth 3 times daily as needed for anxiety   citalopram (CELEXA) 20 MG tablet Past Week at Unknown time  Yes Yes   Sig: Take 20 mg by mouth daily      Facility-Administered Medications: None     Allergies   Allergies   Allergen Reactions     Amoxicillin        Social History   I personally reviewed history with patient:  - quit smoking in 2004  - drinking 5-6 beers 3 times/week  - denies drug use  - currently going through a divorce, living with family and his children    Family History   I personally reviewed history with patient:  - Mom: heart issues  - Dad: Parkinson's disease on dad's side of family    Review of Systems   The 10 point Review of Systems is negative other than noted in the HPI or here.     Physical Exam   Temp: 97.8  F (36.6  C) Temp src: Oral BP: 110/67 Pulse: 98 Heart Rate: 73 Resp: 16 SpO2: 97 % O2 Device: None (Room air)    Vital Signs with Ranges  Temp:  [97.4  F (36.3  C)-97.8  F (36.6  C)] 97.8  F (36.6  C)  Pulse:  [] 98  Heart Rate:  [] 73  Resp:  [0-22] 16  BP: (110-132)/(67-93) 110/67  SpO2:  [92 %-97 %] 97 %  219 lbs 11.2 oz    General: Appears stated age, no acute distress.  Skin:  Warm, dry. No rashes or lesions on exposed skin.  HEENT:  Normocephalic, atraumatic.  Chest:  Bilateral anterior and posterior lung fields clear to auscultation. No increased work of breathing. Does not require supplemental oxygen.  Cardiovascular:  Regular rate and rhythm, without murmur, rub, or  gallop. Bilateral upper and lower distal pulses palpable.   Abdomen:  Soft, non-tender, non-distended. Bowel sounds present.   Musculoskeletal:  Moves all four extremities.   Neurological:  Alert and oriented x 4. Cranial nerves II-XII grossly intact.   Psychiatric:  Affect flat, cooperative, pleasant.    Data   Data reviewed today:  I personally reviewed the EKG tracing showing sinus tachycardia, incomplete right bundle branch block.    Recent Labs  Lab 11/29/17  0030   WBC 8.7   HGB 14.8   MCV 88         POTASSIUM 3.6   CHLORIDE 104   CO2 26   BUN 14   CR 0.99   ANIONGAP 8   KYLE 8.8   *   ALBUMIN 4.0   PROTTOTAL 7.9   BILITOTAL 0.3   ALKPHOS 60   *   AST 50*       Imaging:  No results found for this or any previous visit (from the past 24 hour(s)).

## 2017-11-29 NOTE — PLAN OF CARE
Problem: General Plan of Care (Inpatient Behavioral)  Goal: Discharge Planning  Declined Process group om 11/29/17.
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
## 2017-11-29 NOTE — PROGRESS NOTES
"SPIRITUAL HEALTH SERVICES  SPIRITUAL ASSESSMENT Progress Note  [facility]    PRIMARY FOCUS:     Emotional/spiritual/Jewish distress    Support for coping    ILLNESS CIRCUMSTANCES:   Reviewed documentation. Reflective conversation shared with pt which integrated elements of illness and family narratives. Pt appeared lethargic and mostly reflected on the story of his wife and their relationship    Context of Serious Illness/Symptom(s) Pt attempted to reconcile with his wife after separation. Pt reports that wife betrayed him and he is now expects to be incarcerated.    Resources for Support - Pt did not name any    DISTRESS:     Emotional/Existential/Relational Distress - Broken marriage and betrayal was pt's primary narrative    Spiritual/Baptist Distress - Pt says he was raised Yarsani and wondered, \"If a person kills themselves do they automatically go to hell?\".    Social/Cultural/Economic Distress - None noted    SPIRITUAL/Mormonism COPING:     Sikh/Maite - Pt identifies as \"Yazidi\" and says he \"definitely believes there is something out there\"    Spiritual Practice(s) - [facility] offered prayer but pt says, \"When I pray, I do it alone\"    Emotional/Existential/Relational Connections - Pt was unable to name any. Father is listed as contact but pt did not want to talk about his family. Pt named having children ages 10, 7  And 5 but did not express a sense of connection or concern for them.    GOALS OF CARE:    Goals of Care - Pt says he will DSC tomorrow.    Meaning/Sense-Making - Pt appears to not be able to see his circumstances in any larger context    PLAN: No plans to follow pending DSC. Pt made referral to  based on pt's OK.      Kenny Peter M.Div.  Chaplain Resident  [phone] Pager  "

## 2017-11-29 NOTE — IP AVS SNAPSHOT
[address] [place] [address]    Phone:  [phone]                                       After Visit Summary   11/29/2017    Jignesh Hernandes    MRN: [id number]           After Visit Summary Signature Page     I have received my discharge instructions, and my questions have been answered. I have discussed any challenges I see with this plan with the nurse or doctor.    ..........................................................................................................................................  Patient/Patient Representative Signature      ..........................................................................................................................................  Patient Representative Print Name and Relationship to Patient    ..................................................               ................................................  Date                                            Time    ..........................................................................................................................................  Reviewed by Signature/Title    ...................................................              ..............................................  Date                                                            Time

## 2017-11-30 VITALS
BODY MASS INDEX: 34.48 KG/M2 | HEIGHT: 67 IN | SYSTOLIC BLOOD PRESSURE: 133 MMHG | RESPIRATION RATE: 16 BRPM | WEIGHT: 219.7 LBS | OXYGEN SATURATION: 97 % | TEMPERATURE: 98.2 F | DIASTOLIC BLOOD PRESSURE: 73 MMHG | HEART RATE: 106 BPM

## 2017-11-30 PROCEDURE — 97150 GROUP THERAPEUTIC PROCEDURES: CPT | Mod: GO

## 2017-11-30 PROCEDURE — 90853 GROUP PSYCHOTHERAPY: CPT

## 2017-11-30 PROCEDURE — 25000132 ZZH RX MED GY IP 250 OP 250 PS 637: Performed by: PSYCHIATRY & NEUROLOGY

## 2017-11-30 PROCEDURE — H0001 ALCOHOL AND/OR DRUG ASSESS: HCPCS

## 2017-11-30 RX ORDER — MIRTAZAPINE 15 MG/1
15 TABLET, FILM COATED ORAL AT BEDTIME
Status: DISCONTINUED | OUTPATIENT
Start: 2017-11-30 | End: 2017-11-30 | Stop reason: HOSPADM

## 2017-11-30 RX ORDER — MIRTAZAPINE 15 MG/1
15 TABLET, FILM COATED ORAL AT BEDTIME
Qty: 30 TABLET | Refills: 0 | Status: SHIPPED | OUTPATIENT
Start: 2017-11-30

## 2017-11-30 RX ORDER — DISULFIRAM 250 MG/1
250 TABLET ORAL DAILY
Status: DISCONTINUED | OUTPATIENT
Start: 2017-11-30 | End: 2017-11-30 | Stop reason: HOSPADM

## 2017-11-30 RX ORDER — DISULFIRAM 250 MG/1
250 TABLET ORAL DAILY
Qty: 30 TABLET | Refills: 0 | Status: SHIPPED | OUTPATIENT
Start: 2017-11-30

## 2017-11-30 RX ADMIN — DISULFIRAM 250 MG: 250 TABLET ORAL at 17:14

## 2017-11-30 RX ADMIN — QUETIAPINE FUMARATE 50 MG: 25 TABLET, FILM COATED ORAL at 08:28

## 2017-11-30 RX ADMIN — CITALOPRAM HYDROBROMIDE 20 MG: 20 TABLET ORAL at 08:26

## 2017-11-30 ASSESSMENT — ACTIVITIES OF DAILY LIVING (ADL)
ORAL_HYGIENE: INDEPENDENT
GROOMING: INDEPENDENT

## 2017-11-30 NOTE — PROGRESS NOTES
"Rule 25 Assessment  Background Information   1. Date of Assessment Request 11/29/17 2. Date of Assessment  11/30/17 3. Date Service Authorized     4.   FRANCISCO Sharma   5.  Phone Number   [phone] 6. Referent  [id number] 7. Assessment Site  [facility]     8. Client Name   Jignesh Hernandes 9. Date of Birth  1985 Age  32 year old 10. Gender  male  11. PMI/ Insurance No.  [id number]   12. Client's Primary Language:  English 13. Do you require special accommodations, such as an  or assistance with written material? No   14. Current Address: [address] [place] [id number]   15. Client Phone Numbers: [phone] (home) none (work)     16. Tell me what has happened to bring you here today.    Jignesh Hernandes is a 32 year old male with anxiety who presented to the emergency department on 11/29/17 accompanied by his mother for evaluation of drug overdose. According to the patient, he took 12.5 mg of Ativan today at 1600 in his garage in an attempt to commit suicide. If the drugs did not work, he intended to kill himself with car exhaust or stabbing himself with a knife. He states that he had written suicide notes and left them for his family. This is his first time having thoughts of suicide and attempting suicide. While in the emergency department, he states that he still feels suicidal. He feels \"sedated\" by the drugs but otherwise denies nausea or pain. He is going through a divorce and has full custody of his 3 children. Personal stressors have caused him to feel depressed and suicidal. He currently lives with his parents and 3 children. He states that he is a moderately heavy alcohol drinker, and his last drink was 2 days ago.  The history was obtained from reviewing medical records and staff ordered cd consult.    17. Have you had other rule 25 assessments?     No    DIMENSION I - Acute Intoxication /Withdrawal Potential   1. Chemical use most " recent 12 months outside a facility and other significant use history (client self-report)              X = Primary Drug Used   Age of First Use Most Recent Pattern of Use and Duration   Need enough information to show pattern (both frequency and amounts) and to show tolerance for each chemical that has a diagnosis   Date of last use and time, if needed   Withdrawal Potential? Requiring special care Method of use  (oral, smoked, snort, IV, etc)   x   Alcohol     12  past few years: 3x/week, 5-8 beers   11/27/17  8pm no oral      Marijuana/  Hashish   unsp  past 7-8 years: few times 05/2017 no smoke      Cocaine/Crack     N/A  cocaine: 10+ years ago         Meth/  Amphetamines   N/A           Heroin     N/A           Other Opiates/  Synthetics   N/A           Inhalants     N/A           Benzodiazepines     unsp  Lorazepem  0.5mg/QID, denies any abuse 11/29/17  OD no oral      Hallucinogens     N/A           Barbiturates/  Sedatives/  Hypnotics N/A           Over-the-Counter Drugs   N/A           Other     N/A           Nicotine     N/A          2. Do you use greater amounts of alcohol/other drugs to feel intoxicated or achieve the desired effect?  No.  Or use the same amount and get less of an effect?  No.  Example: NA    3A. Have you ever been to detox?     No    3B. When was the first time?     NA    3C. How many times since then?     NA    3D. Date of most recent detox:     NA    4.  Withdrawal symptoms: Have you had any of the following withdrawal symptoms?  Past 12 months Recent (past 30 days)   None None     's Visual Observations and Symptoms: No visible withdrawal symptoms at this time    Based on the above information, is withdrawal likely to require attention as part of treatment participation?  No    Dimension I Ratings   Acute intoxication/Withdrawal potential - The placing authority must use the criteria in Dimension I to determine a client s acute intoxication and withdrawal potential.    RISK  DESCRIPTIONS - Severity ratin Client displays full functioning with good ability to tolerate and cope with withdrawal discomfort. No signs or symptoms of intoxication or withdrawal or resolving signs or symptoms.    REASONS SEVERITY WAS ASSIGNED (What about the amount of the person s use and date of most recent use and history of withdrawal problems suggests the potential of withdrawal symptoms requiring professional assistance? )     No signs/symptoms of intoxication or withdrawal observed.  Patient had no BAL on admission and a negative urine drug screen.         DIMENSION II - Biomedical Complications and Conditions   1. Do you have any current health/medical conditions?(Include any infectious diseases, allergies, or chronic or acute pain, history of chronic conditions)       No    2. Do you have a health care provider? When was your most recent appointment? What concerns were identified?     [facility].    3. If indicated by answers to items 1 or 2: How do you deal with these concerns? Is that working for you? If you are not receiving care for this problem, why not?      NA    4A. List current medication(s) including over-the-counter or herbal supplements--including pain management:     Current Facility-Administered Medications   Medication     hydrOXYzine (ATARAX) tablet 25-50 mg     citalopram (celeXA) tablet 20 mg     acetaminophen (TYLENOL) tablet 1,000 mg     docusate sodium (COLACE) capsule 100 mg     polyethylene glycol (MIRALAX/GLYCOLAX) Packet 17 g     omeprazole (priLOSEC) CR capsule 20 mg     mirtazapine (REMERON) tablet TABS 7.5 mg     QUEtiapine (SEROquel) tablet 25-50 mg       4B. Do you follow current medical recommendations/take medications as prescribed?     Yes    4C. When did you last take your medication?     17    5. Has a health care provider/healer ever recommended that you reduce or quit alcohol/drug use?     No    6. Are you pregnant?     No    7. Have you had  any injuries, assaults/violence towards you, accidents, health related issues, overdose(s) or hospitalizations related to your use of alcohol or other drugs:     No    8. Do you have any specific physical needs/accommodations? No    Dimension II Ratings   Biomedical Conditions and Complications - The placing authority must use the criteria in Dimension II to determine a client s biomedical conditions and complications.   RISK DESCRIPTIONS - Severity ratin Client displays full functioning with good ability to cope with physical discomfort.    REASONS SEVERITY WAS ASSIGNED (What physical/medical problems does this person have that would inhibit his or her ability to participate in treatment? What issues does he or she have that require assistance to address?)    See physician H&P for full medical history and current medications administered.         DIMENSION III - Emotional, Behavioral, Cognitive Conditions and Complications   1. (Optional) Tell me what it was like growing up in your family. (substance use, mental health, discipline, abuse, support)     Raised by bio parents, 2 older brothers.  No cd/mh.    2. When was the last time that you had significant problems...  A. with feeling very trapped, lonely, sad, blue, depressed or hopeless  about the future? Past Month    B. with sleep trouble, such as bad dreams, sleeping restlessly, or falling  asleep during the day? Past Month    C. with feeling very anxious, nervous, tense, scared, panicked, or like  something bad was going to happen? Past Month    D. with becoming very distressed and upset when something reminded  you of the past? Past Month    E. with thinking about ending your life or committing suicide? Past Month    3. When was the last time that you did the following things two or more times?  A. Lied or conned to get things you wanted or to avoid having to do  something? Never    B. Had a hard time paying attention at school, work, or home? Past  Month    C. Had a hard time listening to instructions at school, work, or home? 2 - 12 months ago    D. Were a bully or threatened other people? Never    E. Started physical fights with other people? Never    Note: These questions are from the Global Appraisal of Individual Needs--Short Screener. Any item marked  past month  or  2 to 12 months ago  will be scored with a severity rating of at least 2.     For each item that has occurred in the past month or past year ask follow up questions to determine how often the person has felt this way or has the behavior occurred? How recently? How has it affected their daily living? And, whether they were using or in withdrawal at the time?    I don't get any support from my ex-wife, I am trying to take care of 3 kids by myself, go to school and it's stressful I cannot do it all by myself.    4A. If the person has answered item 2E with  in the past year  or  the past month , ask about frequency and history of suicide in the family or someone close and whether they were under the influence.     Increasing suicidal ideation, attempt on 11/29/17 by OD on Lorazepam, was sober.  Denies any history of attempts.    Any history of suicide in your family? Or someone close to you?     No    4B. If the person answered item 2E  in the past month  ask about  intent, plan, means and access and any other follow-up information  to determine imminent risk. Document any actions taken to intervene  on any identified imminent risk.      Patient denies any current ideation, he is currently hospitalized on mental health unit and under monitoring and observation.    5A. Have you ever been diagnosed with a mental health problem?     Yes, If yes explain: anxiety and depression    5B. Are you receiving care for any mental health issues? If yes, what is the focus of that care or treatment?  Are you satisfied with the service? Most recent appointment?  How has it been helpful?     Did some individual  therapy with a marriage therapist because wife never came for couples counseling.     6. Have you been prescribed medications for emotional/psychological problems?     Yes.  6B. Current mental health medication(s) If these medications are listed for Dimension II, reference item II-5. See Dim2.   6C. Are you taking your medications as instructed?  yes.    7. Does your MH provider know about your use?     NA    8A. Have you ever been verbally, emotionally, physically or sexually abused?      No     Follow up questions to learn current risk, continuing emotional impact.      NA    8B. Have you received counseling for abuse?      N/A    9. Have you ever experienced or been part of a group that experienced community violence, historical trauma, rape or assault?     No    10A. [place]:    No    11. Do you have problems with any of the following things in your daily life?    Concentration    Note: If the person has any of the above problems, follow up with items 12, 13, and 14. If none of the issues in item 11 are a problem for the person, skip to item 15.        12. Have you been diagnosed with traumatic brain injury or Alzheimer s?  No    13. If the answer to #12 is no, ask the following questions:    Have you ever hit your head or been hit on the head? No    Were you ever seen in the Emergency Room, hospital or by a doctor because of an injury to your head? No    Have you had any significant illness that affected your brain (brain tumor, meningitis, West Nile Virus, stroke or seizure, heart attack, near drowning or near suffocation)? No    14. If the answer to #12 is yes, ask if any of the problems identified in #11 occurred since the head injury or loss of oxygen. NA    15A. Highest grade of school completed:     Associate degree/vocational certificate    15B. Do you have a learning disability? No    15C. Did you ever have tutoring in Math or English? No    15D. Have you ever been diagnosed with Fetal Alcohol Effects  or Fetal Alcohol Syndrome? No    16. If yes to item 15 B, C, or D: How has this affected your use or been affected by your use?     NA    Dimension III Ratings   Emotional/Behavioral/Cognitive - The placing authority must use the criteria in Dimension III to determine a client s emotional, behavioral, and cognitive conditions and complications.   RISK DESCRIPTIONS - Severity ratin Client has difficulty with impulse control and lacks coping skills. Client has thoughts of suicide or harm to others without means; however, the thoughts may interfere with participation in some treatment activities. Client has difficulty functioning in significant life areas. Client has moderate symptoms of emotional, behavioral, or cognitive problems. Client is able to participate in most treatment activities.    REASONS SEVERITY WAS ASSIGNED - What current issues might with thinking, feelings or behavior pose barriers to participation in a treatment program? What coping skills or other assets does the person have to offset those issues? Are these problems that can be initially accommodated by a treatment provider? If not, what specialized skills or attributes must a provider have?    Patient reports anxiety and depression, and feelings of overwhelming stress and hopelessness related to current life circumstances.  He is currently hospitalized due to suicide attempt.  Patient was seen by psychiatry, please refer to psychiatrist consult notes.         DIMENSION IV - Readiness for Change   1. You ve told me what brought you here today. (first section) What do you think the problem really is?     Feeling overwhelmed.    2. Tell me how things are going. Ask enough questions to determine whether the person has use related problems or assets that can be built upon in the following areas: Family/friends/relationships; Legal; Financial; Emotional; Educational; Recreational/ leisure; Vocational/employment; Living arrangements (DSM)       Everything is stressful.    3. What activities have you engaged in when using alcohol/other drugs that could be hazardous to you or others (i.e. driving a car/motorcycle/boat, operating machinery, unsafe sex, sharing needles for drugs or tattoos, etc     driving    4. How much time do you spend getting, using or getting over using alcohol or drugs? (DSM)     Usually just drink on the weekends, I have kids to take care of so cannot be drunk all the time.    5. Reasons for drinking/drug use (Use the space below to record answers. It may not be necessary to ask each item.)  Like the feeling Yes   Trying to forget problems Yes   To cope with stress Yes   To relieve physical pain No   To cope with anxiety No   To cope with depression No   To relax or unwind Yes   Makes it easier to talk with people No   Partner encourages use No   Most friends drink or use No   To cope with family problems No   Afraid of withdrawal symptoms/to feel better No   Other (specify)  N/A     A. What concerns other people about your alcohol or drug use/Has anyone told you that you use too much? What did they say? (DSM)     No, not really.    B. What did you think about that/ do you think you have a problem with alcohol or drug use?     No concerns.    6. What changes are you willing to make? What substance are you willing to stop using? How are you going to do that? Have you tried that before? What interfered with your success with that goal?      I just like the feeling it gives me, it helps me forget about stuff.  I don;t really want to stop but I will have to.  I will probably be going to half-way.    7. What would be helpful to you in making this change?     I don't know.    Dimension IV Ratings   Readiness for Change - The placing authority must use the criteria in Dimension IV to determine a client s readiness for change.   RISK DESCRIPTIONS - Severity rating: 3 Client displays inconsistent compliance, minimal awareness of either the  client s addiction or mental disorder, and is minimally cooperative.    REASONS SEVERITY WAS ASSIGNED - (What information did the person provide that supports your assessment of his or her readiness to change? How aware is the person of problems caused by continued use? How willing is she or he to make changes? What does the person feel would be helpful? What has the person been able to do without help?)      Patient does not feel his drinking is a problem.  He was agreeable to interview and cooperative, but did not express desire to make changes.           DIMENSION V - Relapse, Continued Use, and Continued Problem Potential   1. In what ways have you tried to control, cut-down or quit your use? If you have had periods of sobriety, how did you accomplish that? What was helpful? What happened to prevent you from continuing your sobriety? (DSM)     Yes, I have stopped drinking before. 45 days after I was arrested for the felony (05/2017)    2. Have you experienced cravings? If yes, ask follow up questions to determine if the person recognizes triggers and if the person has had any success in dealing with them.     No.    3. Have you been treated for alcohol/other drug abuse/dependence?     Yes.  3B. Number of times(lifetime) (over what period) 1.  3C. Number of times completed treatment (lifetime) 1.  3D. During the past three years have you participated in outpatient and/or residential?  No  [facility] inpatient (2004)    4. Support group participation: Have you/do you attend support group meetings to reduce/stop your alcohol/drug use? How recently? What was your experience? Are you willing to restart? If the person has not participated, is he or she willing?     Little bit, not really    5. What would assist you in staying sober/straight?     n/a    Dimension V Ratings   Relapse/Continued Use/Continued problem potential - The placing authority must use the criteria in Dimension V to determine a client s relapse,  continued use, and continued problem potential.   RISK DESCRIPTIONS - Severity ratin No awareness of the negative impact of mental health problems or substance abuse. No coping skills to arrest mental health or addiction illnesses, or prevent relapse.    REASONS SEVERITY WAS ASSIGNED - (What information did the person provide that indicates his or her understanding of relapse issues? What about the person s experience indicates how prone he or she is to relapse? What coping skills does the person have that decrease relapse potential?)      Patient has dual disorders and lacks coping skills.  He has high risk for continued drinking and mental health concerns without services         DIMENSION VI - Recovery Environment   1. Are you employed/attending school? Tell me about that.     Unemployed, currently on unemployment due wrongfully terminated 2017.    2A. Describe a typical day; evening for you. Work, school, social, leisure, volunteer, spiritual practices. Include time spent obtaining, using, recovering from drugs or alcohol. (DSM)     Take care of my kids, going to [employer] school for the past couple weeks.    2B. How often do you spend more time than you planned using or use more than you planned? (DSM)     None.    3. How important is using to your social connections? Do many of your family or friends use?     n/a    4A. Are you currently in a significant relationship?     No    4C. Sexual Orientation:     Heterosexual    5A. Who do you live with?      Parents and my 3 children.    5B. Tell me about their alcohol/drug use and mental health issues.     NA    5C. Are you concerned for your safety there? No    5D. Are you concerned about the safety of anyone else who lives with you? No    6A. Do you have children who live with you?     Yes.  (Ask follow-up questions to determine the person s relationship and responsibility, both legal and care giving; and what arrangements are made for supervision for the  children when the person is not available.) full custody of 3 children (10, 7, 5)     6B. Do you have children who do not live with you?     No    7A. Who supports you in making changes in your alcohol or drug use? What are they willing to do to support you? Who is upset or angry about you making changes in your alcohol or drug use? How big a problem is this for you?          7B. This table is provided to record information about the person s relationships and available support It is not necessary to ask each item; only to get a comprehensive picture of their support system.  How often can you count on the following people when you need someone?   Partner / Spouse N/A   Parent(s)/Aunt(s)/Uncle(s)/Grandparents Always supportive   Sibling(s)/Cousin(s) Always supportive   Child(pauline) N/A   Other relative(s) N/A   Friend(s)/neighbor(s) N/A   Child(pauline) s father(s)/mother(s) N/A   Support group member(s) N/A   Community of [place] members N/A   /counselor/therapist/healer N/A   Other (specify) N/A     8A. What is your current living situation?     Independent living    8B. What is your long term plan for where you will be living?     n/a    8C. Tell me about your living environment/neighborhood? Ask enough follow up questions to determine safety, criminal activity, availability of alcohol and drugs, supportive or antagonistic to the person making changes.      No concerns reported.    9. Criminal justice history: Gather current/recent history and any significant history related to substance use--Arrests? Convictions? Circumstances? Alcohol or drug involvement? Sentences? Still on probation or parole? Expectations of the court? Current court order? Any sex offenses - lifetime? What level? (DSM)    2 DUI's (2004/2003), current [facility], 05/2017 charged with second degree assault (next court date 12/11/17)    10. What obstacles exist to participating in treatment? (Time off work, childcare, funding, transportation,  pending residential time, living situation)     None    Dimension VI Ratings   Recovery environment - The placing authority must use the criteria in Dimension VI to determine a client s recovery environment.   RISK DESCRIPTIONS - Severity rating: 3 Client is not engaged in structured, meaningful activity and the client s peers, family, significant other, and living environment are unsupportive, or there is significant criminal justice system involvement.    REASONS SEVERITY WAS ASSIGNED - (What support does the person have for making changes? What structure/stability does the person have in his or her daily life that will increase the likelihood that changes can be sustained? What problems exist in the person s environment that will jeopardize getting/staying clean and sober?)     Patient is recently .  He is currently unemployed but working towards getting a [employer] license.  He has three minor children which he has full-custody of and they are living with his parents.  He reports current legal issues.  He has minimal support.         Client Choice/Exceptions   Would you like services specific to language, age, gender, culture, Baptism preference, race, ethnicity, sexual orientation or disability?  No    What particular treatment choices and options would you like to have? NA    Do you have a preference for a particular treatment program? NA    Criteria for Diagnosis     Criteria for Diagnosis  DSM-5 Criteria for Substance Use Disorder  Instructions: Determine whether the client currently meets the criteria for Substance Use Disorder using the diagnostic criteria in the DSM-V pp.481-585. Current means during the most recent 12 months outside a facility that controls access to substances    Category of Substance Severity (ICD-10 Code / DSM 5 Code)     Alcohol Use Disorder Mild  (F10.10) (305.00)   Cannabis Use Disorder NA   Hallucinogen Use Disorder NA   Inhalant Use Disorder NA   Opioid Use Disorder NA    Sedative, Hypnotic, or Anxiolytic Use Disorder NA   Stimulant Related Disorder NA   Tobacco Use Disorder NA   Other (or unknown) Substance Use Disorder NA       Collateral Contact Summary   Number of contacts made: 1    Contact with referring person:  Yes.    If court related records were reviewed, summarize here: NA    Information from collateral contacts supported/largely agreed with information from the client and associated risk ratings.      Rule 25 Assessment Summary and Plan   's Recommendation    Patient is recommended to attend an [facility] outpatient treatment program.      Collateral Contacts     Name:    [facility]   Relationship:    Medical records   Phone Number:     Releases:         EHR was reviewed and discussed care plan with staff.      Collateral Contacts     Name:    n/a   Relationship:       Phone Number:       Releases:             ollateral Contacts      A problematic pattern of alcohol/drug use leading to clinically significant impairment or distress, as manifested by at least two of the following, occurring within a 12-month period:    Continued alcohol use despite having persistent or recurrent social or interpersonal problems caused or exacerbated by the effects of alcohol/drug.  Recurrent alcohol/drug use in situations in which it is physically hazardous.  Alcohol/drug use is continued despite knowledge of having a persistent or recurrent physical or psychological problem that is likely to have been caused or exacerbated by alcohol.      Specify if: In early remission:  After full criteria for alcohol/drug use disorder were previously met, none of the criteria for alcohol/drug use disorder have been met for at least 3 months but for less than 12 months (with the exception that Criterion A4,  Craving or a strong desire or urge to use alcohol/drug  may be met).     In sustained remission:   After full criteria for alcohol use disorder were previously met, non of the  criteria for alcohol/drug use disorder have been met at any time during a period of 12 months or longer (with the exception that Criterion A4,  Craving or strong desire or urge to use alcohol/drug  may be met).   Specify if:   This additional specifier is used if the individual is in an environment where access to alcohol is restricted.    Mild: Presence of 2-3 symptoms    Moderate: Presence of 4-5 symptoms    Severe: Presence of 6 or more symptoms

## 2017-11-30 NOTE — PROGRESS NOTES
[facility]      ADULT CD ASSESSMENT ADDENDUM      Patient Name: Jignesh Hernandes  Cell Phone:   Home: [phone] (home) none (work)   Mobile:   Telephone Information:   Mobile [phone]       Email:  COLE  Emergency Contact: Alek Hernandes   Tel: [phone]    ________________________________________________________________________      The patient is      With which race do you identify? White    Family History   Mother   Living Father   Living   No Step-mother   NA No Step-father   NA   Maternal Grandmother   NA Fraternal  Grandmother NA   Maternal Grandfather    NA Fraternal   Grandfather NA   No Sister(s)   NA 2 Brother(s)   Living   No Half-sister(s)   NA No Half-brother(s)   NA             Who raised you? (parents, grandparents, adoptive parents, step-parents, etc.)    Both Parents    Have any of your family members or significant others had problems with mental illness or substance abuse?  Please explain.    No    Do you have any children or Stepchildren? Yes, please explain: 3 children (10,7,5)    Are you being investigated by Child Protection Services? No    Do you have a child protection worker, probation office or ? No    How would you describe your current finances?  Just making it    If you are having problems, (unpaid bills, bankruptcy, IRS problems) please explain:  No    If working or a student are you able to function appropriately in that setting? Yes    Describe your preferred learning style:  Not specified    What personal strengths do you have that can help you get sober?  n/a    Do you currently self-administer your medications?  Yes    Have you ever had to lie to people important to you about how much you martinez?     No   Have you ever felt the need to bet more and more money?     No   Have you ever attempted treatment for a gambling problem?     No   Have you ever touched or fondled someone else inappropriately or forced them to have sex with you against  their will?     No   Are you or have you ever been a registered sex offender?     No   Is there any history of sexual abuse in your family?     No   Have you ever felt obsessed by your sexual behavior, such as having sex with many partners, masturbating often, using pornography often?     No   Have you ever received therapy or stayed in the hospital for mental health problems?     Yes, If yes explain: current, denies any previous   Have you ever hurt yourself, such as cutting, burning or hitting yourself?     No   Have you ever purged, binged or restricted yourself as a way to control your weight?     No   Are you on a special diet?     No   Do you have any concerns regarding your nutritional status?     No   Have you had any appetite changes in the last 3 months?     No   Have you had any weight loss or weight gain in the last 3 months?    If weight patient gained or lost was more than 10 lbs, then refer to program RN / attending Physician for assessment.     No   Was the patient informed of BMI?         No   Do you have any dental problems?     No   Have you ever lived through any trauma or stressful life events?     Yes, If yes explain: current legal, divorce   In the past month, have you had any of the following symptoms related to the trauma listed above? (dreams, intense memories, flashbacks, physical reactions, etc.)     No   Have you ever believed people were spying on you, or that someone was plotting against you or trying to hurt you?     No   Have you ever believed someone was reading your mind or could hear your thoughts or that you could actually read someone's mind or hear what another person was thinking?     No   Have you ever believed that someone of some force outside of yourself was putting thoughts into your mind or made you act in a way that was not your usual self?  Have you ever though you were possessed?     No   Have you ever believed you were being sent special messages through the TV,  radio or newspaper?     No   Have you ever heard things other people couldn't hear, such as voices or other noises?     No   Have you ever had visions when you were awake?  Or have you ever seen things other people couldn't see?     No       Suicide Screening Questions:   1. Are you feeling hopeless about the present/future?     Yes, If yes explain: probably going to [facility]   2. Have you ever had thoughts about taking your life?     Yes   3. When did you have these thoughts?     11/29/17   4. Do you have any current intent or active desire to take your life?     No   5. Do you have a plan to take your life?     No   6. Have you ever made a suicide attempt?     Yes, If yes explain: 11/29/17   7. Do you have access to pills, guns or other methods to kill yourself?     No     Guide to Risk Ratings   IDEATION: Active thoughts of suicide? INTENT: Intent to follow on suicide? PLAN: Plan to follow through on suicide? Level of Risk:   IF Yes Yes Yes Patient = High Emergent   IF Yes Yes No Patient = High Urgent/Non-Emergent   IF Yes No No Patient = Moderate Non-Urgent   IF No No   No Patient = Low Risk   The patient's ADDITIONAL RISK FACTORS and lack of PROTECTIVE FACTORS may increase their overall suicide risk ratings.     Patient's Responses (within the last 30 days)   IDEATION: Active thoughts of suicide?    Yes     INTENT: Intent to follow on suicide?    Yes     PLAN: Plan to follow through on suicide?    Yes     Determining the level of risk depends on the patient responses, suicide risk factors and protective factors.     Additional Risk Factors: Significant history of having untreated or poorly treated mental health symptoms  A recent loss that was significant to the patient, i.e. loss of job, loss of home, divorce, break-up, etc.  Significant legal problems including being at risk of incarceration  Alcohol use   Protective Factors:  Having people in his/her life that would prevent the patient from considering  committing suicide (i.e. young children, spouse, parents, etc.)  Having easy access to supportive family members     Risk Status   Emergent? No   Urgent / Non-Emergent? Yes, Referral to PCP or psychiatrist and Continuous monitoring, assessment and intervention   Present / Non- Urgent? See above    Low Risk? See above   Additional information to support suicide risk rating: patient currently under observation and on mental health unit stabilizing       Mental Health Status   Physical Appearance/Attire: Appropriate to situation   Hygiene: well groomed   Eye Contact: at examiner   Speech Rate:  regular   Speech Volume: regular   Speech Quality: fluid   Cognitive/Perceptual:  reality based   Cognition: memory intact    Judgment: intact   Insight: limited   Orientation:  time, place, person and situation   Thought::   logical    Hallucinations:  none   General Behavioral Tone: cooperative   Psychomotor Activity: no problem noted   Gait:  no problem   Mood: depressed and hopeless   Affect: flat/none   Counselor Notes: NA       Criteria for Diagnosis: DSM-5 Criteria for Substance Use Disorders      Alcohol Use Disorder Mild - 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  Yes    Counselor: [facility]      Vulnerable Adult Checklist for OUTPATIENTS     1.  Do you have a physical, emotional or mental infirmity or dysfunction?       No    2.  Does this issue impair your ability to provide for your own care without help, including providing yourself with food, shelter, clothing, healthcare or supervision?       No    3.  Because of this issue, I need assistance to  protect myself from maltreatment by others.      No    Based on the above information:    This person is not a functional Vulnerable Adult according to [place] Statute 626.5572 subdivision 21.

## 2017-11-30 NOTE — PROGRESS NOTES
Patient discharged denies suicidal ideation and has stable mood. Instructions gone over with patient regarding medications and follow up plan.  . Copies of discharge instructions ( After Visit Summary) given to patient. Patient's  Mother here during Discharge and states she will watch him take antabuse. Pt to follow up with [facility] upon discharge

## 2017-11-30 NOTE — PLAN OF CARE
Problem: Patient Care Overview  Goal: Discharge Needs Assessment  Patient attended Process Group today, but declined to participate. Patient noted to be napping through much of group.

## 2017-11-30 NOTE — PROGRESS NOTES
Spoke with FRANCISCO Mcclellan. She plans to see patient today for chem. Health assessment.    Also, reviewed safety planning worksheet with patient, in view of overdose bringing him into hospital.

## 2017-11-30 NOTE — PLAN OF CARE
Problem: Depressive Symptoms  Goal: Depressive Symptoms  Signs and symptoms of listed problems will be absent or manageable.  1. Mood stability   2. Absence of suicidal ideation/contracts for safety   3. Depressive s/sx resolved  4. Safety plan in place  5. Positive coping skills identified/utilized  6. Medication regiment established/compliance  7. Adequate sleep  8. Housing community support  9. F/u plan in place     Outcome: No Change  Pt presented with a flat, tense affect. Appears depressed. He spent the shift resting in bed. He got up for a phone call, and ate dinner with encouragement from staff. He did not attend groups. Pt is under the impression that he is leaving tomorrow, even though he is on a 72 hr hold.

## 2017-11-30 NOTE — DISCHARGE INSTRUCTIONS
Behavioral Discharge Planning and Instructions    Summary: Admitted to hospital after suicide attempt by overdose    Main Diagnosis: Major depression; Anxiety disorder, NOS; Alcohol use disorder     Major Treatments, Procedures and Findings: psychiatric assessment; chemical health assessment    Symptoms to Report: feeling more aggressive, mood getting worse or thoughts of suicide    Lifestyle Adjustment: Sober lifestyle recommended. Complete treatment and follow up with AA and sponsor. Follow up with medication management, treatment, and then counseling counseling to assist with anxiety and depression management.    Psychiatry Follow-up:     Dr. Azevedo  - appointment on Friday 12/15/17 @ 1:45 pm.      [facility]  [address], [address]  [place]  [address]  Phone:  [phone]  Fax:  [phone]    Parents to watch you take Antabuse.    [facility]  [id number]  [place] [address] [phone]    Questions about chemical health referral- call FRANCISCO Mcclellan @ [phone]    Resources:   [facility]-   Crisis Intervention: 543.691.2106 or 344-314-4668 (TTY: 365.393.4551).  Call anytime for help.  National [place] on Mental Illness (www.[place]bert.org): 445.316.1274 or 628-874-4731.  Alcoholics Anonymous (www.alcoholics-anonymous.org): Check your phone book for your local chapter.  National Suicide Prevention Line (www.mentalhealthmn.org): 527-521-NAYR (5156)    General Medication Instructions:   See your medication sheet(s) for instructions.   Take all medicines as directed.  Make no changes unless your doctor suggests them.   Go to all your doctor visits.  Be sure to have all your required lab tests. This way, your medicines can be refilled on time.  Do not use any drugs not prescribed by your doctor.  Avoid alcohol.

## 2017-11-30 NOTE — CONSULTS
CHEMICAL DEPENDENCY ASSESSMENT      EVALUATION COUNSELOR:  FRANCISCO Gaines.   PATIENT'S ADDRESS:   [address] [place] [address], [place] [address].   TELEPHONE:  [phone].   STATISTICS:  Date of Birth 1985.  Age:  32.  Sex:  Male.  Marital Status:  .   [facility]   REFERRAL SOURCE:  [facility], station 77.   REFERRING PROVIDER:  Dr. Troy Azevedo.      REASON FOR EVALUATION:  Jignesh Hernandes is a 32-year-old male with anxiety who presented to the Emergency Department on 11/29/2017 accompanied by his mother for evaluation of a drug overdose.  According to the patient, he had taken 12.5 mg of Ativan that day at 1600 in his garage in an attempt to commit suicide.  If the drugs did not work, he intended to kill himself with car exhaust or stab himself with a knife.  He states he had written a suicide note and left them for his family.  This was the first time he was having thoughts of suicide or attempting suicide, and while in the Emergency Department he has stated that he felt suicidal.  He felt sedated by the drugs, but otherwise denied any nausea or pain.  He is going through a divorce and has full custody of his 3 children.  Personal stressors that caused him to feel depressed and suicidal.  He currently lives with his parents and 3 children.  He states that he is a heavy alcohol drinker and his last drink was 2 days ago.  History was obtained from reviewing medical records and staff ordered CD consult.      HEALTH HISTORY AND MEDICATIONS:  See medical record for complete medical history and medications administered.      HISTORY OF PREVIOUS TREATMENT AND COUNSELING:  The patient denies any history of detox admissions or any past hospitalizations related to alcohol or drug use or mental health.  He reports he had attempted to do some couples counseling; however, wife did not show up so he did some individual therapy, but nothing currently.  He had been doing  inpatient program at [facility] back in 2004 following DWIs.  He reports he has attended AA meetings, none current.      HISTORY OF ALCOHOL AND DRUG USE:  The patient reports alcohol use, age of first use 12.  Reports for the past few years he typically drinks 3 times a week, anywhere from 5-8 beers per time.  Last use 11/27/2017.  Reports he smoked marijuana a few times in the past 7-8 years and last time was 05/2017.  He has a history of cocaine use over 10 years ago and reports he is currently prescribed 0.5 mg of lorazepam 4 times daily.  He denies any abuse of this, however, did attempt to overdose with that on 11/29/2017.  The patient denies any other substance use.      SUMMARY OF CHEMICAL DEPENDENCY SYMPTOMS ACKNOWLEDGED BY PATIENT:  The patient identifies with 3 out of the 11 DSM-V criteria for impression of a substance use disorder.      SUMMARY OF COLLATERAL DATA:  [facility] electronic health record was reviewed.      MENTAL HEALTH STATUS ASSESSMENT:  Physical appearance and attire appropriate to situation.  Hygiene well groomed.  Eye contact at examiner.  Speech regular, volume regular, quality fluid.  Cognitive perceptual reality based.  Cognition:  Memory intact.  Judgment intact.  Insight limited.  Orientation to time, place, person and situation.  Thought logical.  Hallucinations:  None.  General behavioral tone:  Cooperative.  Psychomotor activity:  No problem noted.  Gait:  No problem.  Mood depressed and hopeless and affect flat.      VULNERABLE ADULT ASSESSMENT:  This patient is currently admitted to the hospital; therefore, is a categorized vulnerable adult according to [place] statute.      IMPRESSION:  F10.20, alcohol use disorder, mild.      [facility] PLACEMENT CRITERIA:   DIMENSION 1:  Intoxication withdrawal:  Risk level 0.  No signs or symptoms of intoxication or withdrawal observed.  Patient had no BAL on admission and a negative urine drug screen.      DIMENSION 2:  Biomedical conditions:   Risk level 0.  See physician H&P for full medical history and current medications administered.      DIMENSION 3:  Emotional/behavioral:  Risk level 2.  The patient reports anxiety and depression and feelings of overwhelming stress and hopelessness related to current life circumstances.  He is currently hospitalized due to suicide attempt.  The patient was seen by Psychiatry, please refer to consultation note.      DIMENSION 4:  Readiness to change:  Risk level 3.  The patient does not feel his drinking is a problem.  He was agreeable to interviewing cooperative but did not express a desire to make changes.        DIMENSION 5:  Relapse and Continued Use potential:  The patient has dual disorders and lacks coping skills.  He is high risk for continued drinking and mental health concerns without services.       DIMENSION 6:  Recovery Environment:  Risk level 3.  The patient is recently .  He is currently unemployed but working towards getting a [place] license.  He has 3 minor children which he has full custody of, and they are living with his parents.  He reports current legal issues and he has minimal support.      INITIAL PROBLEM LIST:  The patient has poor coping skills and MICD issues.      RECOMMENDATIONS:  The patient is recommended to attend an MICD outpatient treatment program.      RATIONALE:  At this time the patient has main mild substance use disorder; however, he appears to have significant mental health concerns which appear primary given his suicide attempt while he had not been intoxicated to impair his judgment. Would suggest MICD treatment in order for him to develop insight into how his substance use does affect his mental health, but it is impairative he seek services for mental health.         FRANCISCO VAZQUEZ             D: 2017 14:08   T: 2017 16:13   MT: [id number]      Name:     KRYSTIAN ROBERTSON   MRN:      [id number]        Account:       [id number]   :       1985           Consult Date:  11/30/2017      Document: [id number]       cc: Troy Azevedo MD

## 2017-11-30 NOTE — PROGRESS NOTES
[facility] Psychiatric Progress Note       Interim History     The patient's care was discussed with the treatment team and chart notes were reviewed. Pt seen on SDU. Tolerating medications without side effects. Side effects, risks, and benefits of medications reviewed with patient. Pt is stabilizing on current medications.  He states he was able to participate better today, felt less groggy. He reports improved sleep architecture with Remeron. He met with the CD counselor, however he does not believe his alcohol use is an issue, encouraged pt to participate in treatment to prevent further relapse and potentially affect his mental health. Pt verbalized understanding. Discussed starting pt on Antabuse.  Reviewed the side effects, benefits, and complications of medication. Pt gave verbal agreement to begin Antabuse 250mg qam. His parents will watch him take the medication. Pt to discharge today. Denies suicidal or homicidal ideation. 30 day supply of medication provided at time of discharge. Pt to follow-up with Dr. Azevedo at [facility] within 2 weeks.       Hospital Course     On 11/29/17, pt was admitted to [facility] following an intentional benzodiazepine overdose in an apparent suicide attempt. Remeron 7.5mg qhs and Seroquel 25-50mg q2h prn was started. He was encouraged to have a CD assessment for CD treatment at [facility] outpatient MI/CD program. CD assessment ordered. On 11/30/17, CD assessment was completed and pt reported feeling less depressed as well as improved sleep architecture with Remeron. He began to stabilize on medication. Denies suicidal or homicidal ideation. CD assessment was completed and pt was referred to [facility] MI/CD program. He was also started on Antabuse and have his parents watch him take the medication at least twice a week. 30 day supply of medication provided at time of discharge. Pt to follow-up with   "Jolly at [facility] within 2 weeks, appointment scheduled. Pt was stable and ready for discharge.       Medications     Current Facility-Administered Medications Ordered in Epic   Medication Dose Route Frequency Last Rate Last Dose     hydrOXYzine (ATARAX) tablet 25-50 mg  25-50 mg Oral Q4H PRN         citalopram (celeXA) tablet 20 mg  20 mg Oral Daily   20 mg at 11/30/17 0826     acetaminophen (TYLENOL) tablet 1,000 mg  1,000 mg Oral Q6H PRN         docusate sodium (COLACE) capsule 100 mg  100 mg Oral BID PRN         polyethylene glycol (MIRALAX/GLYCOLAX) Packet 17 g  17 g Oral Daily PRN         omeprazole (priLOSEC) CR capsule 20 mg  20 mg Oral Daily PRN         mirtazapine (REMERON) tablet TABS 7.5 mg  7.5 mg Oral At Bedtime   7.5 mg at 11/29/17 2003     QUEtiapine (SEROquel) tablet 25-50 mg  25-50 mg Oral Q2H PRN   50 mg at 11/30/17 0828     No current [facility]-ordered outpatient prescriptions on file.         Allergies      Allergies   Allergen Reactions     Amoxicillin         Medical Review of Systems     /73  Pulse 106  Temp 98.2  F (36.8  C) (Oral)  Resp 16  Ht 1.702 m (5' 7\")  Wt 99.7 kg (219 lb 11.2 oz)  SpO2 97%  BMI 34.41 kg/m2  Body mass index is 34.41 kg/(m^2).  A 10-point review of systems was performed by Troy Azevedo MD and is negative, no new findings.      Psychiatric Examination     Appearance Sitting in chair, dressed in scrubs. Appears stated age.   Attitude Cooperative   Orientation Oriented to person, place, time   Eye Contact Poor   Speech Regular rate, rhythm, volume and tone   Language Normal   Psychomotor Behavior Normal   Mood Less depressed   Affect Flat   Thought Process Goal-Oriented, Intact   Associations Intact   Thought Content Patient is currently negative for suicide ideation, negative for plan or intent, able to contract no self harm and identify barriers to suicide.  Negative for obsessions, compulsions or psychosis.      Fund of Knowledge " Average   Insight Impaired   Judgement Improving   Attention Span & Concentration Alert   Recent & Remote Memory Intact   Gait Normal   Muscle Tone Intact        Labs     Labs reviewed.  No results found for this or any previous visit (from the past 24 hour(s)).     Impression     This is a 32 year old male with a history of depression, anxiety, and alcohol use who presents after a significant benzodiazepine overdose. He was encouraged to participate in outpatient MI/CD treatment at [facility]. Remeron will be increased to 15mg qhs. Discussed starting pt on Antabuse.  Reviewed the side effects, benefits, and complications of medication. Pt gave verbal agreement to begin Antabuse 250mg. He will have his parents witness him take the medication at least twice a week. Denies suicidal or homicidal ideation. Pt to discharge today.      Diagnoses     1. Major depression, recurrent, severe, without psychotic features.   2. Anxiety, NOS.  3. Alcohol Use Disorder, Moderate.     Plan     1. Explained side effects, benefits, and complications of medications to the patient, Pt gave verbal consent.  2. Medication changes: Begin Antabuse 250mg qam. Increase Remeron to 15mg qhs.  3. Discussed treatment plan with patient and team.  4. Projected length of stay: Pt to discharge today.  5. Intake to be scheduled at [facility].  6. Pt to follow-up with Dr. Azevedo at [facility] within 2 weeks.      Attestation:   Patient has been seen and evaluated by me, Troy Azevedo MD.    Patient ID:  Name: Jignesh Hernandes   MRN: [id number]  Admission: 11/29/2017   YOB: 1985

## 2017-11-30 NOTE — PLAN OF CARE
Problem: Depressive Symptoms  Goal: Depressive Symptoms  Signs and symptoms of listed problems will be absent or manageable.  1. Mood stability   2. Absence of suicidal ideation/contracts for safety   3. Depressive s/sx resolved  4. Safety plan in place  5. Positive coping skills identified/utilized  6. Medication regiment established/compliance  7. Adequate sleep  8. Housing community support  9. F/u plan in place     Outcome: No Change  Pt was visible, but withdrawn within the SDU. Pt has a blunt flat affect with an anxious depressed mood. Pt attended all groups but minimally participated, when prompted. Pt spent most of the day in groups or the lounge, but did not socialize with any peers. Pt is dull upon approach. Pt showered. Pt ate most of his food and was med compliant.

## 2017-12-01 NOTE — DISCHARGE SUMMARY
[facility] Psychiatric Discharge Summary      DATE OF ADMISSION: 11/29/2017     DATE OF DISCHARGE: 11/30/2017    PRIMARY CARE PHYSICIAN: Clinic, [facility] [place]    IDENTIFICATION: Patient is a 32 year old  male with three children currently living in [place]. He has seen a counselor for marriage therapy. Pt seen on SDU for worsening depression with intentional benzodiazepine overdose. For history, see dictation by Dr. Azevedo on 11/29/17. For physical summary, see dictation by LINA Hills CNP on 11/29/17.     HOSPITAL COURSE: Mr. Jignesh Hernandes presented to the [facility] ED following an intentional Ativan overdose due to his ex-wife turning him in for violating a DANCO. He had written a suicide note and endorsed to ED staff that he would attempt carbon monoxide poisoning He endorsed that he has been prescribed the medication and did not obtain this from another source. Per [place] , he has been receiving Ativan 0.5mg tablets prescribed by Arabella Buck. Pt has severely depressed mood, motivation poor, concentration poor, low energy, hopeless, helpless, and worthless with SI, no plan, able to contract for safety. Pt is feeling irritable, angry, agitated, chronically unhappy and roller coaster ride of emotions.  Pt is an anxious person, a worrier. Pt endorses consistent, pervasive sense of anxiety and worry. Pt finds this anxiety difficult to control, often resulting in restlessness, feeling on edge, irritability, and sleep disturbance.  Pt endorses symptoms related to excessive alcohol use, including over drinking, unsuccessful attempts to curb use, craving its use, having it interfere with work and personal relationships, and continued use despite known bad consequences. Pt also has increased tolerance.  AST is 123 and ALT is 50 on admission.     On 11/29/17, pt was admitted to [facility] following an intentional benzodiazepine overdose in an apparent suicide attempt.  "Remeron 7.5mg qhs and Seroquel 25-50mg q2h prn was started. He was encouraged to have a CD assessment for CD treatment at [facility] outpatient MI/CD program. CD assessment ordered. On 11/30/17, CD assessment was completed and pt reported feeling less depressed as well as improved sleep architecture with Remeron. He began to stabilize on medication. Denies suicidal or homicidal ideation. CD assessment was completed and pt was referred to [facility] MI/CD program. He was also started on Antabuse and have his parents watch him take the medication at least twice a week. 30 day supply of medication provided at time of discharge. Pt to follow-up with Dr. Azevedo at [facility] within 2 weeks, appointment scheduled. Pt was stable and ready for discharge.         DISCHARGE MENTAL STATUS EXAMINATION:    Appearance Sitting in chair, dressed in scrubs. Appears stated age.   Attitude Cooperative   Orientation Oriented to person, place, time   Eye Contact Poor   Speech Regular rate, rhythm, volume and tone   Language Normal   Psychomotor Behavior Normal   Mood Less depressed   Affect Flat   Thought Process Goal-Oriented, Intact   Associations Intact   Thought Content Patient is currently negative for suicide ideation, negative for plan or intent, able to contract no self harm and identify barriers to suicide.  Negative for obsessions, compulsions or psychosis.      Fund of Knowledge Average   Insight Impaired   Judgement Improving   Attention Span & Concentration Alert   Recent & Remote Memory Intact   Gait Normal   Muscle Tone Intact          LABORATORY DATA:    Refer to hospitalist admission dictation.  No results found for this or any previous visit (from the past 24 hour(s)).     /73  Pulse 106  Temp 98.2  F (36.8  C) (Oral)  Resp 16  Ht 1.702 m (5' 7\")  Wt 99.7 kg (219 lb 11.2 oz)  SpO2 97%  BMI 34.41 kg/m2     DISCHARGE MEDICATIONS:      Review of your medicines "      START taking       Dose / Directions    disulfiram 250 MG tablet   Commonly known as:  ANTABUSE   Used for:  Severe recurrent major depression without psychotic features (H)        Dose:  250 mg   Take 1 tablet (250 mg) by mouth daily   Quantity:  30 tablet   Refills:  0       mirtazapine 15 MG tablet   Commonly known as:  REMERON   Used for:  Severe recurrent major depression without psychotic features (H)        Dose:  15 mg   Take 1 tablet (15 mg) by mouth At Bedtime   Quantity:  30 tablet   Refills:  0         CONTINUE these medicines which have NOT CHANGED       Dose / Directions    citalopram 20 MG tablet   Commonly known as:  celeXA        Dose:  20 mg   Take 20 mg by mouth daily   Refills:  0         STOP taking          LORazepam 1 MG tablet   Commonly known as:  ATIVAN                Where to get your medicines      These medications were sent to [facility] [id number] [place] [address] [address] AT [facility] [address]  [address], [facility] [address]     Phone:  [phone]      disulfiram 250 MG tablet     mirtazapine 15 MG tablet             DISCHARGE DIAGNOSES:    1. Major depression, recurrent, severe, without psychotic features.   2. Anxiety, NOS.  3. Alcohol Use Disorder, Moderate.    DISCHARGE PLAN:     1. Explained side effects, benefits, and complications of medications to the patient, Pt gave verbal consent.  2. Medication changes: Begin Antabuse 250mg qam. Increase Remeron to 15mg qhs.  3. Discussed treatment plan with patient and team.  4. Projected length of stay: Pt to discharge today.  5. Intake to be scheduled at [facility].  6. Pt to follow-up with Dr. Azevedo at [facility] within 2 weeks.      DISCHARGE FOLLOW-UP:    Psychiatry Follow-up:      Dr. Azevedo  - appointment on Friday 12/15/17 @ 1:45 pm.      [facility]  [address], [address]  [place]  [address]  Phone:  [phone]  Fax:   [phone]     Parents to watch her take Antabuse.     [facility]  [address]  [place] [address]  [phone]     Questions about chemical health referral- call Ping Flor,FRANCISCO @ [phone]    Attestation:   Patient has been seen and evaluated by me, Troy Azevedo MD.    Patient ID:    Name: Jignesh Hernandes   MRN: [id number]  Admission: 11/29/2017   YOB: 1985

## 2017-12-01 NOTE — PROGRESS NOTES
INITIAL O.T. ASSESSMENT   Details:  Pt participated in OT Life Skills group today. Affect was appropriate to situation. Pt was quiet but attentive during the 45 minute, structured group, He engaged in the discussion only when directly prompted. Pt identified fishing as a hobby he enjoys.     Plan: Pt has orders to discharge.

## 2018-11-17 ENCOUNTER — HOSPITAL ENCOUNTER (EMERGENCY)
Facility: CLINIC | Age: 33
Discharge: HOME | End: 2018-11-17
Attending: EMERGENCY MEDICINE | Admitting: EMERGENCY MEDICINE
Payer: COMMERCIAL

## 2018-11-17 ENCOUNTER — APPOINTMENT (OUTPATIENT)
Dept: CT IMAGING | Facility: CLINIC | Age: 33
End: 2018-11-17
Attending: EMERGENCY MEDICINE
Payer: COMMERCIAL

## 2018-11-17 VITALS
SYSTOLIC BLOOD PRESSURE: 141 MMHG | RESPIRATION RATE: 57 BRPM | OXYGEN SATURATION: 92 % | WEIGHT: 210 LBS | DIASTOLIC BLOOD PRESSURE: 84 MMHG | HEIGHT: 67 IN | TEMPERATURE: 97.8 F | BODY MASS INDEX: 32.96 KG/M2 | HEART RATE: 103 BPM

## 2018-11-17 DIAGNOSIS — J18.9 PNEUMONIA OF BOTH LOWER LOBES DUE TO INFECTIOUS ORGANISM: ICD-10-CM

## 2018-11-17 DIAGNOSIS — J98.01 ACUTE BRONCHOSPASM: ICD-10-CM

## 2018-11-17 LAB
ALBUMIN SERPL-MCNC: 4.2 G/DL (ref 3.4–5)
ALP SERPL-CCNC: 66 U/L (ref 40–150)
ALT SERPL W P-5'-P-CCNC: 79 U/L (ref 0–70)
ANION GAP SERPL CALCULATED.3IONS-SCNC: 5 MMOL/L (ref 3–14)
AST SERPL W P-5'-P-CCNC: 45 U/L (ref 0–45)
BILIRUB SERPL-MCNC: 0.3 MG/DL (ref 0.2–1.3)
BUN SERPL-MCNC: 18 MG/DL (ref 7–30)
CALCIUM SERPL-MCNC: 8.9 MG/DL (ref 8.5–10.1)
CHLORIDE SERPL-SCNC: 104 MMOL/L (ref 94–109)
CO2 SERPL-SCNC: 29 MMOL/L (ref 20–32)
CREAT SERPL-MCNC: 1.02 MG/DL (ref 0.66–1.25)
D DIMER PPP FEU-MCNC: 0.7 UG/ML FEU (ref 0–0.5)
ERYTHROCYTE [DISTWIDTH] IN BLOOD BY AUTOMATED COUNT: 12.3 % (ref 10–15)
GFR SERPL CREATININE-BSD FRML MDRD: 84 ML/MIN/1.7M2
GLUCOSE SERPL-MCNC: 96 MG/DL (ref 70–99)
HCT VFR BLD AUTO: 41.8 % (ref 40–53)
HGB BLD-MCNC: 13.9 G/DL (ref 13.3–17.7)
MCH RBC QN AUTO: 29.4 PG (ref 26.5–33)
MCHC RBC AUTO-ENTMCNC: 33.3 G/DL (ref 31.5–36.5)
MCV RBC AUTO: 89 FL (ref 78–100)
PLATELET # BLD AUTO: 240 10E9/L (ref 150–450)
POTASSIUM SERPL-SCNC: 3.7 MMOL/L (ref 3.4–5.3)
PROT SERPL-MCNC: 7.9 G/DL (ref 6.8–8.8)
RBC # BLD AUTO: 4.72 10E12/L (ref 4.4–5.9)
SODIUM SERPL-SCNC: 138 MMOL/L (ref 133–144)
TROPONIN I SERPL-MCNC: <0.015 UG/L (ref 0–0.04)
WBC # BLD AUTO: 7.1 10E9/L (ref 4–11)

## 2018-11-17 PROCEDURE — 25000128 H RX IP 250 OP 636: Performed by: EMERGENCY MEDICINE

## 2018-11-17 PROCEDURE — 85379 FIBRIN DEGRADATION QUANT: CPT | Performed by: EMERGENCY MEDICINE

## 2018-11-17 PROCEDURE — 94640 AIRWAY INHALATION TREATMENT: CPT

## 2018-11-17 PROCEDURE — 99285 EMERGENCY DEPT VISIT HI MDM: CPT | Mod: 25

## 2018-11-17 PROCEDURE — 84484 ASSAY OF TROPONIN QUANT: CPT | Performed by: EMERGENCY MEDICINE

## 2018-11-17 PROCEDURE — 93005 ELECTROCARDIOGRAM TRACING: CPT

## 2018-11-17 PROCEDURE — 25000125 ZZHC RX 250: Performed by: EMERGENCY MEDICINE

## 2018-11-17 PROCEDURE — 25000132 ZZH RX MED GY IP 250 OP 250 PS 637: Performed by: EMERGENCY MEDICINE

## 2018-11-17 PROCEDURE — 40000275 ZZH STATISTIC RCP TIME EA 10 MIN

## 2018-11-17 PROCEDURE — 71260 CT THORAX DX C+: CPT

## 2018-11-17 PROCEDURE — 85027 COMPLETE CBC AUTOMATED: CPT | Performed by: EMERGENCY MEDICINE

## 2018-11-17 PROCEDURE — 80053 COMPREHEN METABOLIC PANEL: CPT | Performed by: EMERGENCY MEDICINE

## 2018-11-17 RX ORDER — CODEINE PHOSPHATE AND GUAIFENESIN 10; 100 MG/5ML; MG/5ML
2 SOLUTION ORAL EVERY 4 HOURS PRN
Qty: 120 ML | Refills: 0 | Status: SHIPPED | OUTPATIENT
Start: 2018-11-17 | End: 2018-12-23

## 2018-11-17 RX ORDER — DOXYCYCLINE 100 MG/1
100 CAPSULE ORAL 2 TIMES DAILY
Qty: 14 CAPSULE | Refills: 0 | Status: SHIPPED | OUTPATIENT
Start: 2018-11-17 | End: 2018-11-24

## 2018-11-17 RX ORDER — IBUPROFEN 600 MG/1
600 TABLET, FILM COATED ORAL ONCE
Status: COMPLETED | OUTPATIENT
Start: 2018-11-17 | End: 2018-11-17

## 2018-11-17 RX ORDER — PREDNISONE 20 MG/1
TABLET ORAL
Qty: 10 TABLET | Refills: 0 | Status: SHIPPED | OUTPATIENT
Start: 2018-11-17 | End: 2018-12-19

## 2018-11-17 RX ORDER — IOPAMIDOL 755 MG/ML
500 INJECTION, SOLUTION INTRAVASCULAR ONCE
Status: COMPLETED | OUTPATIENT
Start: 2018-11-17 | End: 2018-11-17

## 2018-11-17 RX ORDER — ALBUTEROL SULFATE 90 UG/1
2 AEROSOL, METERED RESPIRATORY (INHALATION) EVERY 6 HOURS PRN
Qty: 1 INHALER | Refills: 0 | Status: SHIPPED | OUTPATIENT
Start: 2018-11-17

## 2018-11-17 RX ORDER — PREDNISONE 20 MG/1
60 TABLET ORAL ONCE
Status: COMPLETED | OUTPATIENT
Start: 2018-11-17 | End: 2018-11-17

## 2018-11-17 RX ORDER — IPRATROPIUM BROMIDE AND ALBUTEROL SULFATE 2.5; .5 MG/3ML; MG/3ML
3 SOLUTION RESPIRATORY (INHALATION)
Status: DISCONTINUED | OUTPATIENT
Start: 2018-11-17 | End: 2018-11-17 | Stop reason: HOSPADM

## 2018-11-17 RX ADMIN — PREDNISONE 60 MG: 20 TABLET ORAL at 20:01

## 2018-11-17 RX ADMIN — IOPAMIDOL 81 ML: 755 INJECTION, SOLUTION INTRAVENOUS at 18:45

## 2018-11-17 RX ADMIN — IBUPROFEN 600 MG: 600 TABLET ORAL at 20:08

## 2018-11-17 RX ADMIN — SODIUM CHLORIDE 98 ML: 9 INJECTION, SOLUTION INTRAVENOUS at 18:45

## 2018-11-17 RX ADMIN — IPRATROPIUM BROMIDE AND ALBUTEROL SULFATE 3 ML: .5; 3 SOLUTION RESPIRATORY (INHALATION) at 19:01

## 2018-11-17 NOTE — ED AVS SNAPSHOT
[facility] Emergency Department    [address]    [facility] [address]    Phone:  [phone]    Fax:  [phone]                                       Jignesh Hernandes   MRN: [id number]    Department:  [facility] Emergency Department   Date of Visit:  11/17/2018           Patient Information     Date Of Birth          1985        Your diagnoses for this visit were:     Pneumonia of both lower lobes due to infectious organism (H)     Acute bronchospasm        You were seen by Pasha Watson MD.      Follow-up Information     Follow up with Clinic, [facility] In 1 week.    Contact information:    [address] [facility] [address]  [phone]          Follow up with [facility] Emergency Department.    Specialty:  EMERGENCY MEDICINE    Why:  If symptoms worsen    Contact information:    [address]  [facility] [id number] [phone]        Discharge Instructions       Discharge Instructions  Bronchitis, Pneumonia, Bronchospasm    You were seen today for a chest infection or inflammation. If your provider decided this was due to a bacterial infection, you may need an antibiotic. Sometimes these are caused by a virus, and then an antibiotic will not help.     Generally, every Emergency Department visit should have a follow-up clinic visit with either a primary or a specialty clinic/provider. Please follow-up as instructed by your emergency provider today.    Return to the Emergency Department if:    Your breathing gets much worse.    You are very weak, or feel much more ill.    You develop new symptoms, such as chest pain.    You cough up blood.    You are vomiting (throwing up) enough that you cannot keep fluids or your medicine down.    What can I do to help myself?    Fill any prescriptions the provider gave you and take them right away--especially antibiotics. Be sure to finish the whole antibiotic  prescription.    You may be given a prescription for an inhaler, which can help loosen tight air passages.  Use this as needed, but not more often than directed. Inhalers work much better when used with a spacer.     You may be given a prescription for a steroid to reduce inflammation. Used long-term, these can have side effects, but for short-term use they are safe. You may notice restlessness or increased appetite.        You may use non-prescription cough or cold medicines. Cough medicines may help, but don t make the cough go away completely.     Avoid smoke, because this can make your symptoms worse. If you smoke, this may be a good time to quit! Consider using nicotine lozenges, gum, or patches to reduce cravings.     If you have a fever, Tylenol  (acetaminophen), Motrin  (ibuprofen), or Advil  (ibuprofen) may help bring fever down and may help you feel more comfortable. Be sure to read and follow the package directions, and ask your provider if you have questions.    Be sure to get your flu shot each year.  For certain ages, the pneumonia shot can help prevent pneumonia.  If you were given a prescription for medicine here today, be sure to read all of the information (including the package insert) that comes with your prescription.  This will include important information about the medicine, its side effects, and any warnings that you need to know about.  The pharmacist who fills the prescription can provide more information and answer questions you may have about the medicine.  If you have questions or concerns that the pharmacist cannot address, please call or return to the Emergency Department.     Remember that you can always come back to the Emergency Department if you are not able to see your regular provider in the amount of time listed above, if you get any new symptoms, or if there is anything that worries you.      24 Hour Appointment Hotline       To make an appointment at any [facility], call  [phone] ([phone]). If you don't have a family doctor or clinic, we will help you find one. [facility] clinics are conveniently located to serve the needs of you and your family.             Review of your medicines      START taking        Dose / Directions Last dose taken    albuterol 108 (90 Base) MCG/ACT inhaler   Commonly known as:  PROAIR HFA/PROVENTIL HFA/VENTOLIN HFA   Dose:  2 puff   Quantity:  1 Inhaler        Inhale 2 puffs into the lungs every 6 hours as needed for shortness of breath / dyspnea or wheezing   Refills:  0        doxycycline 100 MG capsule   Commonly known as:  VIBRAMYCIN   Dose:  100 mg   Quantity:  14 capsule        Take 1 capsule (100 mg) by mouth 2 times daily for 7 days   Refills:  0        guaiFENesin-codeine 100-10 MG/5ML Soln solution   Commonly known as:  ROBITUSSIN AC   Dose:  2 tsp.   Quantity:  120 mL        Take 10 mLs by mouth every 4 hours as needed for cough   Refills:  0        predniSONE 20 MG tablet   Commonly known as:  DELTASONE   Quantity:  10 tablet        Take two tablets (= 40mg) each day for 5 (five) days   Refills:  0          Our records show that you are taking the medicines listed below. If these are incorrect, please call your family doctor or clinic.        Dose / Directions Last dose taken    citalopram 20 MG tablet   Commonly known as:  celeXA   Dose:  20 mg        Take 20 mg by mouth daily   Refills:  0        disulfiram 250 MG tablet   Commonly known as:  ANTABUSE   Dose:  250 mg   Quantity:  30 tablet        Take 1 tablet (250 mg) by mouth daily   Refills:  0        LORAZEPAM PO        Refills:  0        mirtazapine 15 MG tablet   Commonly known as:  REMERON   Dose:  15 mg   Quantity:  30 tablet        Take 1 tablet (15 mg) by mouth At Bedtime   Refills:  0                Prescriptions were sent or printed at these locations (4 Prescriptions)                   Other Prescriptions                Printed at Department/Unit printer (4 of 4)          albuterol (PROAIR HFA/PROVENTIL HFA/VENTOLIN HFA) 108 (90 Base) MCG/ACT inhaler               doxycycline (VIBRAMYCIN) 100 MG capsule               guaiFENesin-codeine (ROBITUSSIN AC) 100-10 MG/5ML SOLN solution               predniSONE (DELTASONE) 20 MG tablet                Procedures and tests performed during your visit     CBC (platelets, no diff)    CT Chest Pulmonary Embolism w Contrast    Comprehensive metabolic panel    D dimer quantitative    Troponin I (now)      Orders Needing Specimen Collection     None      Pending Results     Date and Time Order Name Status Description    11/17/2018 1826 CT Chest Pulmonary Embolism w Contrast Preliminary             Pending Culture Results     No orders found from 11/15/2018 to 11/18/2018.            Pending Results Instructions     If you had any lab results that were not finalized at the time of your Discharge, you can call the ED Lab Result RN at [phone]. You will be contacted by this team for any positive Lab results or changes in treatment. The nurses are available 7 days a week from 10A to 6:30P.  You can leave a message 24 hours per day and they will return your call.        Test Results From Your Hospital Stay        11/17/2018  5:54 PM      Component Results     Component Value Ref Range & Units Status    WBC 7.1 4.0 - 11.0 10e9/L Final    RBC Count 4.72 4.4 - 5.9 10e12/L Final    Hemoglobin 13.9 13.3 - 17.7 g/dL Final    Hematocrit 41.8 40.0 - 53.0 % Final    MCV 89 78 - 100 fl Final    MCH 29.4 26.5 - 33.0 pg Final    MCHC 33.3 31.5 - 36.5 g/dL Final    RDW 12.3 10.0 - 15.0 % Final    Platelet Count 240 150 - 450 10e9/L Final         11/17/2018  6:14 PM      Component Results     Component Value Ref Range & Units Status    Sodium 138 133 - 144 mmol/L Final    Potassium 3.7 3.4 - 5.3 mmol/L Final    Chloride 104 94 - 109 mmol/L Final    Carbon Dioxide 29 20 - 32 mmol/L Final    Anion Gap 5 3 - 14 mmol/L Final    Glucose 96 70 - 99 mg/dL Final     Urea Nitrogen 18 7 - 30 mg/dL Final    Creatinine 1.02 0.66 - 1.25 mg/dL Final    GFR Estimate 84 >60 mL/min/1.7m2 Final    Non  GFR Calc    GFR Estimate If Black >90 >60 mL/min/1.7m2 Final    African American GFR Calc    Calcium 8.9 8.5 - 10.1 mg/dL Final    Bilirubin Total 0.3 0.2 - 1.3 mg/dL Final    Albumin 4.2 3.4 - 5.0 g/dL Final    Protein Total 7.9 6.8 - 8.8 g/dL Final    Alkaline Phosphatase 66 40 - 150 U/L Final    ALT 79 (H) 0 - 70 U/L Final    AST 45 0 - 45 U/L Final         11/17/2018  6:14 PM      Component Results     Component Value Ref Range & Units Status    Troponin I ES <0.015 0.000 - 0.045 ug/L Final    The 99th percentile for upper reference range is 0.045 ug/L.  Troponin values   in the range of 0.045 - 0.120 ug/L may be associated with risks of adverse   clinical events.                 11/17/2018  6:25 PM      Component Results     Component Value Ref Range & Units Status    D Dimer 0.7 (H) 0.0 - 0.50 ug/ml FEU Final    This D-dimer assay is intended for use in conjunction with a clinical pretest   probability assessment model to exclude pulmonary embolism (PE) and deep   venous thrombosis (DVT) in outpatients suspected of PE or DVT. The cut-off   value is 0.5 ug/mL FEU.           11/17/2018  7:06 PM      Narrative     CT CHEST PULMONARY EMBOLISM WITH CONTRAST  11/17/2018 6:46 PM     HISTORY: Chest pain, elevated D-dimer, shortness of breath.    COMPARISON: None.    TECHNIQUE: Volumetric helical acquisition of CT images of the chest  from the lung apices to the kidneys were acquired after the  administration of 81 mL Isovue-370 IV contrast. Radiation dose for  this scan was reduced using automated exposure control, adjustment of  the mA and/or kV according to patient size, or iterative  reconstruction technique.    FINDINGS: There is no pulmonary embolism. Patchy lower lobe  infiltrates right worse than left. The heart is not enlarged. Thoracic  aorta is unremarkable without  evidence of dissection or aneurysm.  There is no pleural or pericardial effusion.  There is no  pneumothorax. Adrenal glands are normal. Marked hepatic steatosis.  Remainder of the visualized upper abdomen is unremarkable.        Impression     IMPRESSION:   1. No pulmonary embolism demonstrated.  2. No thoracic aortic dissection or aneurysm.   3. Bilateral lower lobe infiltrates right worse than left.  4. Marked hepatic steatosis.                Clinical Quality Measure: Blood Pressure Screening     Your blood pressure was checked while you were in the emergency department today. The last reading we obtained was  BP: 141/84 . Please read the guidelines below about what these numbers mean and what you should do about them.  If your systolic blood pressure (the top number) is less than 120 and your diastolic blood pressure (the bottom number) is less than 80, then your blood pressure is normal. There is nothing more that you need to do about it.  If your systolic blood pressure (the top number) is 120-139 or your diastolic blood pressure (the bottom number) is 80-89, your blood pressure may be higher than it should be. You should have your blood pressure rechecked within a year by a primary care provider.  If your systolic blood pressure (the top number) is 140 or greater or your diastolic blood pressure (the bottom number) is 90 or greater, you may have high blood pressure. High blood pressure is treatable, but if left untreated over time it can put you at risk for heart attack, stroke, or kidney failure. You should have your blood pressure rechecked by a primary care provider within the next 4 weeks.  If your provider in the emergency department today gave you specific instructions to follow-up with your doctor or provider even sooner than that, you should follow that instruction and not wait for up to 4 weeks for your follow-up visit.        Thank you for choosing [facility]       Thank you for choosing [facility] for  "your care. Our goal is always to provide you with excellent care. Hearing back from our patients is one way we can continue to improve our services. Please take a few minutes to complete the written survey that you may receive in the mail after you visit with us. Thank you!        [facility] Information     Private Practice lets you send messages to your doctor, view your test results, renew your prescriptions, schedule appointments and more. To sign up, go to [email or web address] . Click on \"Log in\" on the left side of the screen, which will take you to the Welcome page. Then click on \"Sign up Now\" on the right side of the page.     You will be asked to enter the access code listed below, as well as some personal information. Please follow the directions to create your username and password.     Your access code is: [email or web address]  Expires: 2/15/2019  8:14 PM     Your access code will  in 90 days. If you need help or a new code, please call your [facility] clinic or [phone].        Care EveryWhere ID     This is your Care EveryWhere ID. This could be used by other organizations to access your [facility] medical records  [id number]        Equal Access to Services     NICOLAS BLAKE : Tavo Willis, nadira baires, angy radford, marylou cervantes. So [facility] [phone].    ATENCIÓN: Si habla español, tiene a clarke disposición servicios gratuitos de asistencia lingüística. Llame al [phone].    We comply with applicable federal civil rights laws and [place] laws. We do not discriminate on the basis of race, color, national origin, age, disability, sex, sexual orientation, or gender identity.            After Visit Summary       This is your record. Keep this with you and show to your community pharmacist(s) and doctor(s) at your next visit.                  "

## 2018-11-17 NOTE — ED AVS SNAPSHOT
[facility] Emergency Department    [address]    [facility] [address]    Phone:  [phone]    Fax:  [phone]                                       Jignehs Hernandes   MRN: [id number]    Department:  [facility] Emergency Department   Date of Visit:  11/17/2018           After Visit Summary Signature Page     I have received my discharge instructions, and my questions have been answered. I have discussed any challenges I see with this plan with the nurse or doctor.    ..........................................................................................................................................  Patient/Patient Representative Signature      ..........................................................................................................................................  Patient Representative Print Name and Relationship to Patient    ..................................................               ................................................  Date                                   Time    ..........................................................................................................................................  Reviewed by Signature/Title    ...................................................              ..............................................  Date                                               Time          22EPIC Rev 08/18

## 2018-11-17 NOTE — LETTER
November 17, 2018      To Whom It May Concern:      Jignesh Hernandes was seen in our Emergency Department today, 11/17/18.  I expect his condition to improve over the next couple of days.  He may return to work/school when improved.    Sincerely,        Roberta MARSHALL RN

## 2018-11-18 LAB — INTERPRETATION ECG - MUSE: NORMAL

## 2018-11-18 NOTE — ED NOTES
Ambulated pt with pulse ox. Pt ambulated well. SpO2 remained at 92% throughout walking, and continued at 92% when sitting. Pt expressed no shortness of breath.

## 2018-11-18 NOTE — ED PROVIDER NOTES
History     Chief Complaint:    Chest Pain and Shortness of Breath      HPI   Jignesh Hernandes is a 33 year old gentleman, with no significant past medical history, presents to the emergency department for evaluation of chest tightness and shortness of breath with wheezing over the last week. 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 He denies any other aggravating factors or relieving factors. Denies any nausea or sweating with this. Denies any recent long travel, leg swelling, history of cancer, or recent surgery. He was seen at urgent care today and was given a nebulizer treatment which did help somewhat with his oxygen saturations but noted that his oxygen saturations dipped with ambulation, so he was sent to the emergency department. Reportedly had a chest x-ray there of which he was read as normal. Has not had any recent sick contacts. Denies any fevers, chills, abdominal pain, or other symptoms. He has been taking Robitussin DM without any relief.    Allergies:    Allergies   Allergen Reactions     Amoxicillin         Medications:        albuterol (PROAIR HFA/PROVENTIL HFA/VENTOLIN HFA) 108 (90 Base) MCG/ACT inhaler   guaiFENesin-codeine (ROBITUSSIN AC) 100-10 MG/5ML SOLN solution   LORAZEPAM PO   predniSONE (DELTASONE) 20 MG tablet   citalopram (CELEXA) 20 MG tablet   disulfiram (ANTABUSE) 250 MG tablet   mirtazapine (REMERON) 15 MG tablet       Problem List:      Patient Active Problem List    Diagnosis Date Noted     Suicide attempt (H) 11/29/2017     Priority: Medium     Dermatitis 10/22/2012     Priority: Medium        Past Medical History:      Past Medical History:   Diagnosis Date     Gastroesophageal reflux disease        Past Surgical History:      No past surgical history on file.    Family History:      No family history on file.    Social History:    Marital Status:   [4]  Social History   Substance Use Topics      Smoking status: Former Smoker     Quit date: 9/24/2004     Smokeless tobacco: Never Used     Alcohol use 0.0 oz/week      Comment: once a week.        Review of Systems   Constitutional: Negative for chills and fever.   Respiratory: Positive for chest tightness, shortness of breath and wheezing.    Gastrointestinal: Negative for abdominal pain.   Musculoskeletal: Negative for myalgias.             Physical Exam     No data found.        Physical Exam  General: Alert, no acute distress; speaking in full sentences  Neuro:  PERRL.  EOMI.  Gait stable, no focal deficits  HEENT:  Moist mucous membranes.  Posterior oropharynx clear, no exudates.  Conjunctiva normal.   CV:  RRR, no m/r/g, skin warm and well perfused  Pulm:  Inspiratory/expiratory wheezing bilaterally with prolonged expiratory phase.  No rales. No acute distress, breathing comfortably  GI:  Soft, nontender, nondistended.  No rebound or guarding.  Normal bowel sounds  MSK:  Moving all extremities.  No focal areas of edema, erythema, or tenderness  Skin:  WWP, no rashes, no lower extremity edema, skin color normal, no diaphoresis  Psych:  Well-appearing, normal affect, regular speech    Emergency Department Course     ECG (17:19:27):  Rate 97 bpm. RI interval 138. QRS duration 92. QT/QTc 356/452. P-R-T axes 44 49 20. Normal sinus rhythm. Normal ECG. Interpreted at 1725 by Pasha Watson MD.    Imaging:  CT Chest Pulmonary Embolism w Contrast  1. No pulmonary embolism demonstrated.  2. No thoracic aortic dissection or aneurysm.   3. Bilateral lower lobe infiltrates right worse than left.  4. Marked hepatic steatosis.  Read per radiology.      Emergency Department Course:    ED Course       Impression & Plan      Medical Decision Making:  Jignesh Hernandes is a 33 year old male presents emergency department for evaluation of chest tightness and shortness of breath over the last week.  Was seen in urgent care where chest x-ray was performed.  He was also  given a nebulizer treatment with some improvement of his symptoms.  Review of the patient's chart shows the impression of the chest x-ray which unfortunately cannot see is otherwise negative for pneumonia or effusion.  Vital signs otherwise stable.  He does have inspiratory and expiratory wheezing with prolonged expiratory phase.  EKG obtained showed sinus rhythm without any acute ST changes concerning for ischemia or infarct.  Troponin is normal given symptoms have been ongoing for the last week doubt ACS.  Heart score is low and therefore I do not feel any further cardiac enzyme testing is indicated.  He was given 2 DuoNeb's in the emergency department with improvement of wheezing.  He is low risk for PE, however, he is PERC positive with HR and d dimer is elevated.  CT PE study was thus obtained which was negative for pulmonary embolism; CT does show bilateral lower lob infiltrates R > L.  Will treat for community acquired pneumonia.  I will have him follow up with his primary care doctor in the next 7 days.  He will return for any new or worsening symptoms discussed at bedside.      Diagnosis:    ICD-10-CM    1. Pneumonia of both lower lobes due to infectious organism (H) J18.1    2. Acute bronchospasm J98.01        Disposition:  discharged to home    Discharge Medications:  Discharge Medication List as of 11/17/2018  8:14 PM      START taking these medications    Details   albuterol (PROAIR HFA/PROVENTIL HFA/VENTOLIN HFA) 108 (90 Base) MCG/ACT inhaler Inhale 2 puffs into the lungs every 6 hours as needed for shortness of breath / dyspnea or wheezing, Disp-1 Inhaler, R-0, Local Print      doxycycline (VIBRAMYCIN) 100 MG capsule Take 1 capsule (100 mg) by mouth 2 times daily for 7 days, Disp-14 capsule, R-0, Local Print      guaiFENesin-codeine (ROBITUSSIN AC) 100-10 MG/5ML SOLN solution Take 10 mLs by mouth every 4 hours as needed for cough, Disp-120 mL, R-0, Local Print      predniSONE (DELTASONE) 20 MG tablet  Take two tablets (= 40mg) each day for 5 (five) days, Disp-10 tablet, R-0, Local Print               RUST, Pasha Botello MD  11/17/2018   [facility] EMERGENCY DEPARTMENT       RUST, Pasha Botello MD  11/17/18 [id number]       RUSTPasha MD  12/05/18 [id number]       RUSTPasha MD  12/05/18 [id number]

## 2018-12-05 ASSESSMENT — ENCOUNTER SYMPTOMS
SHORTNESS OF BREATH: 1
CHEST TIGHTNESS: 1
FEVER: 0
WHEEZING: 1
ABDOMINAL PAIN: 0
CHILLS: 0
MYALGIAS: 0

## 2018-12-19 ENCOUNTER — APPOINTMENT (OUTPATIENT)
Dept: GENERAL RADIOLOGY | Facility: CLINIC | Age: 33
End: 2018-12-19
Attending: NURSE PRACTITIONER
Payer: COMMERCIAL

## 2018-12-19 ENCOUNTER — HOSPITAL ENCOUNTER (EMERGENCY)
Facility: CLINIC | Age: 33
Discharge: HOME | End: 2018-12-19
Attending: NURSE PRACTITIONER | Admitting: NURSE PRACTITIONER
Payer: COMMERCIAL

## 2018-12-19 VITALS
HEIGHT: 67 IN | BODY MASS INDEX: 34.81 KG/M2 | DIASTOLIC BLOOD PRESSURE: 83 MMHG | WEIGHT: 221.78 LBS | OXYGEN SATURATION: 95 % | HEART RATE: 95 BPM | TEMPERATURE: 97.3 F | RESPIRATION RATE: 18 BRPM | SYSTOLIC BLOOD PRESSURE: 141 MMHG

## 2018-12-19 DIAGNOSIS — J45.21 MILD INTERMITTENT ASTHMA WITH EXACERBATION: ICD-10-CM

## 2018-12-19 PROCEDURE — 25000125 ZZHC RX 250: Performed by: NURSE PRACTITIONER

## 2018-12-19 PROCEDURE — 94640 AIRWAY INHALATION TREATMENT: CPT

## 2018-12-19 PROCEDURE — 99284 EMERGENCY DEPT VISIT MOD MDM: CPT | Mod: 25

## 2018-12-19 PROCEDURE — 71046 X-RAY EXAM CHEST 2 VIEWS: CPT

## 2018-12-19 RX ORDER — CEFTRIAXONE 1 G/1
1 INJECTION, POWDER, FOR SOLUTION INTRAMUSCULAR; INTRAVENOUS ONCE
Status: DISCONTINUED | OUTPATIENT
Start: 2018-12-19 | End: 2018-12-19

## 2018-12-19 RX ORDER — IPRATROPIUM BROMIDE AND ALBUTEROL SULFATE 2.5; .5 MG/3ML; MG/3ML
3 SOLUTION RESPIRATORY (INHALATION) ONCE
Status: COMPLETED | OUTPATIENT
Start: 2018-12-19 | End: 2018-12-19

## 2018-12-19 RX ORDER — PREDNISONE 20 MG/1
60 TABLET ORAL ONCE
Status: COMPLETED | OUTPATIENT
Start: 2018-12-19 | End: 2018-12-19

## 2018-12-19 RX ORDER — PREDNISONE 20 MG/1
TABLET ORAL
Qty: 10 TABLET | Refills: 0 | Status: SHIPPED | OUTPATIENT
Start: 2018-12-19 | End: 2018-12-23

## 2018-12-19 RX ORDER — BENZONATATE 100 MG/1
100 CAPSULE ORAL 3 TIMES DAILY PRN
Qty: 30 CAPSULE | Refills: 0 | Status: SHIPPED | OUTPATIENT
Start: 2018-12-19 | End: 2018-12-26

## 2018-12-19 RX ADMIN — IPRATROPIUM BROMIDE AND ALBUTEROL SULFATE 3 ML: .5; 3 SOLUTION RESPIRATORY (INHALATION) at 16:21

## 2018-12-19 RX ADMIN — IPRATROPIUM BROMIDE AND ALBUTEROL SULFATE 3 ML: .5; 3 SOLUTION RESPIRATORY (INHALATION) at 15:07

## 2018-12-19 RX ADMIN — PREDNISONE 60 MG: 20 TABLET ORAL at 15:08

## 2018-12-19 ASSESSMENT — ENCOUNTER SYMPTOMS
CHILLS: 0
WHEEZING: 1
SORE THROAT: 0
FEVER: 0
SHORTNESS OF BREATH: 1
SINUS PAIN: 0
COUGH: 1

## 2018-12-19 ASSESSMENT — MIFFLIN-ST. JEOR: SCORE: 1909.63

## 2018-12-19 NOTE — ED TRIAGE NOTES
Patient presents to ER with cough, no fevers concerned about being diagnosed with pneumonia.  He was diagnosed with pneumonia 2-3 weeks ago in the ER took medication.  Then patient was seen by primary 3 days ago was diagnosed again with pneumonia was prescribed 2 medication was only able to fill azithromycin but not the other medication. Patient here d/t not feeling better. ABC's intact.

## 2018-12-19 NOTE — ED AVS SNAPSHOT
[facility] Emergency Department  [address]  [facility] [address]  Phone:  [phone]  Fax:  [phone]                                    Jignesh Hernandes   MRN: [id number]    Department:  [facility] Emergency Department   Date of Visit:  12/19/2018           After Visit Summary Signature Page    I have received my discharge instructions, and my questions have been answered. I have discussed any challenges I see with this plan with the nurse or doctor.    ..........................................................................................................................................  Patient/Patient Representative Signature      ..........................................................................................................................................  Patient Representative Print Name and Relationship to Patient    ..................................................               ................................................  Date                                   Time    ..........................................................................................................................................  Reviewed by Signature/Title    ...................................................              ..............................................  Date                                               Time          22EPIC Rev 08/18

## 2018-12-19 NOTE — ED PROVIDER NOTES
"  History     Chief Complaint:  Cough    HPI   Jignesh Hernandes is a 33 year old male with history of asthma who presents to the emergency department today with cough. Patient was here for cough and was diagnosed with pneumonia on 11/17/18. He took medications as prescribed and improved.  Over the last 5 days he again developed coughing and shortness of breath.  He was seen in clinic on 12/17/18 and diagnosed with right lower lobe pneumonia based upon lung exam.  He was started on Azithromycin but has not felt much improvement.  He has been using albuterol inhaler 2-3 times per days which does help with shortness of breath.  He states his cough is worse when lying down at night.  Denies fevers, vomiting, chest pain, dizziness/lightheadedness and any further concerns.     Allergies:  Amoxicillin    Medications:    Proair   Celexa   Antabuse   Robitussin  Remeron  Deltasone     Past Medical History:    Suicide attempt   Dermatitis   GERD   Asthma    Past Surgical History:    History reviewed. No pertinent past surgical history.     Family History:    History reviewed. No pertinent family history.     Social History:  The patient was alone.  Smoking Status: Former  Smokeless Tobacco: Never  Alcohol Use: Yes   Marital Status:      Review of Systems   Constitutional: Negative for chills and fever.   HENT: Positive for congestion. Negative for sinus pain and sore throat.    Respiratory: Positive for cough, shortness of breath and wheezing.    Cardiovascular: Negative for chest pain.   All other systems reviewed and are negative.      Physical Exam     Patient Vitals for the past 24 hrs:   BP Temp Temp src Pulse Resp SpO2 Height Weight   12/19/18 1714 -- -- -- -- -- 95 % -- --   12/19/18 1149 141/83 97.3  F (36.3  C) Temporal 95 18 93 % 1.702 m (5' 7\") 100.6 kg (221 lb 12.5 oz)      Physical Exam  Constitutional: Alert, attentive, GCS 15.    HENT: Mucous membranes are moist.   Eyes: EOM are normal. PERRLA. Conjunctiva " Assessment   1  GERD (gastroesophageal reflux disease) (530 81) (K21 9)   2  Positive FIT (fecal immunochemical test) (792 1) (R19 5)    Plan   As above  Patient will contact me after  her EGD /colonoscopy Routine follow-up  Discussion/Summary       Sinusitis-resolved  Heme-positive stool be having colonoscopy and EGD History of choking episodes-suspect reflux  Encouraged to go ahead and get EGD  If normal then will Consider ENT referral No evidence for murmur  Chief Complaint   The patient is here today for a follow up  History of Present Illness   Gloria Jurado is here for f/u of her sinusitis and possible heart murmur heard by Dr Kajal Jackson at visit in Dec   says she is feeling better  Clear mucous  Cough much better  No sore throat but thinks it is related to weather changes  shortness of breath  also describes episodes of choking her whole life  She feels like somehting went down the wrong tube  Starts choking and gasping for breath  Can occur while lupright sitting but also while asleep  Occurs infrequently for the past 30 years  No hoarseness or dysphagia  Active Problems   1  Abnormal findings on diagnostic imaging of liver (793 3) (R93 2)   2  Acute foot pain, right (729 5) (M79 671)   3  Acute upper respiratory infection (465 9) (J06 9)   4  Anxiety (300 00) (F41 9)   5  Back pain (724 5) (M54 9)   6  Blood in stool (578 1) (K92 1)   7  Breast cancer screening (V76 10) (Z12 39)   8  Colon cancer screening (V76 51) (Z12 11)   9  Current tobacco use (305 1) (Z72 0)   10  Encounter for screening mammogram for malignant neoplasm of breast (V76 12)      (Z12 31)   11  Encounter for vitamin deficiency screening (V77 99) (Z13 21)   12  Flu vaccine need (V04 81) (Z23)   13  GERD (gastroesophageal reflux disease) (530 81) (K21 9)   14  Heart murmur (785 2) (R01 1)   15  Hemoptysis (786 30) (R04 2)   16  Hemorrhoids (455 6) (K64 9)   17  Hyperlipidemia (272 4) (E78 5)   18   Hypertension (401 9) (I10) 19  Intervertebral disc disorders with radiculopathy, lumbar region (724 4) (M51 16)   20  Lumbar stenosis (724 02) (M48 061)   21  Need for pneumococcal vaccination (V03 82) (Z23)   22  Need for pneumococcal vaccine (V03 82) (Z23)   23  Need for shingles vaccine (V04 89) (Z23)   24  Need for Tdap vaccination (V06 1) (Z23)   25  Osteoporosis (733 00) (M81 0)   26  Overweight (278 02) (E66 3)   27  Pain of upper extremity (729 5) (M79 603)   28  Positive FIT (fecal immunochemical test) (792 1) (R19 5)   29  Right hip pain (719 45) (M25 551)   30  Right shoulder pain (719 41) (M25 511)   31  Sinusitis (473 9) (J32 9)   32  Skin lesion (709 9) (L98 9)   33  Stiffness of right shoulder joint (719 51) (M25 611)   34  Tendinitis of right rotator cuff (726 10) (M75 81)   35  Visit for routine gyn exam (V72 31) (Z01 419)   36  Well adult on routine health check (V70 0) (Z00 00)   37  Yeast infection (112 9) (B37 9)    Past Medical History   1  History of Screening for thyroid disorder (V77 0) (Z13 29)   2  History of UTI symptoms (788 99) (R39 9)    Family History   Mother    1  Denied: Family history of Crohn's disease without complication, unspecified     gastrointestinal tract location   2  Denied: Family history of colon cancer   3  Denied: Family history of liver disease   4  Family history of Heart Disease (V17 49)   5  Family history of Mother  At Age 80  Father    10  Denied: Family history of Crohn's disease without complication, unspecified     gastrointestinal tract location   7  Family history of Diabetes Mellitus (V18 0)   8  Denied: Family history of colon cancer   9  Denied: Family history of liver disease   10  Family history of Father  At Age 80   9  Family history of Heart Disease (V17 49)    Social History    · Caffeine Use   · Current tobacco use (305 1) (Z72 0)   · Former smoker (Q37 01) (S97 356)   · Never Drank Alcohol   · Use of electronic cigarettes    Current Meds    1   Alendronate pink, no scleral icterus or conjunctival injection  CV: regular rate and rhythm; no murmurs, rubs or gallups.  Radial, dorsalis pedis and posterior tibial pulses 2+ bilaterally.  Cap refill <2 seconds.  Respiratory: Effort normal. Diffuse expiratory wheezes. No crackles/rubs. Decreased air movement.  GI:  There is no tenderness; rebound or guarding. No distension. Normal bowel sounds.  MSK: Normal range of motion. No peripheral edema or calf tenderness.  Neurological: Alert, attentive.  Skin: Skin is warm and dry.  No rashes or petechiae.  Psychiatric: Normal affect.    Emergency Department Course   Imaging:  Radiology findings were communicated with the patient who voiced understanding of the findings.  Chest XR,  PA & LAT   Final Result   IMPRESSION: No evidence of pneumonia. Lungs are well-inflated and   clear. Heart size is normal.      NANCY CABRERA MD         Interventions:  1507: Duoneb, 3 mL, nebulization    1508: Deltasone 60 mg PO   1621: Duoneb, 3 mL, nebulization      Emergency Department Course:  Nursing notes and vitals reviewed.  1500: I performed an exam of the patient as documented above.   The patient was sent for a Chest XR while in the emergency department, results above.   1600: I rechecked the patient. Lung sounds improved but continued with end-expiratory wheeze. SpO2 95%. RR 16. HR 80s.  1700: I rechecked the patient.  Lung sounds clear. SpO2 95%. HR 80s. No distress.   1719: Findings and plan explained to the Patient. Patient discharged home with instructions regarding supportive care, medications, and reasons to return. The importance of close follow-up was reviewed. The patient was prescribed Tessalon.   I personally reviewed the imaging results with the Patient and answered all related questions prior to  discharge.     Impression & Plan    Medical Decision Making:  Jignesh Hernandes is a 33 year old male with a PMH of asthma and recent pneumonia who presents for evaluation of shortness of  Sodium 70 MG Oral Tablet; take 1 tablet once weekly; Therapy: 96HWU2104 to (Rosita Sykes)  Requested for: 02EPY7303; Last     Rx:07Jan2018 Ordered   2  Aspirin EC 81 MG Oral Tablet Delayed Release; Take 1 tablet daily Recorded   3  Atorvastatin Calcium 10 MG Oral Tablet; take 1 tablet by mouth every day; Therapy: 13ODX3198 to ([place])  Requested for: 97RSC8539; Last     Rx:30Nov2017 Ordered   4  Calcium Plus Vitamin D CAPS; Therapy: (Recorded:43Hsy6355) to Recorded   5  Cranberry CAPS; Therapy: (Recorded:16Vat2957) to Recorded   6  Cyclobenzaprine HCl - 10 MG Oral Tablet; one tablet tid prn; Therapy: 84DJR4299 to (Last Rx:09Oct2017)  Requested for: 94YEM4963; Status: ACTIVE     - Renewal Denied Ordered   7  Fish Oil 1000 MG Oral Capsule; Therapy: (Recorded:66Dlq4768) to Recorded   8  Fluticasone Propionate 50 MCG/ACT Nasal Suspension; USE 2 SPRAYS IN EACH     NOSTRIL ONCE DAILY Recorded   9  Lisinopril 20 MG Oral Tablet; TAKE 1 TABLET DAILY; Therapy: 22QAM2107 to (WDFATEBT:82LBV5228)  Requested for: 63OPO9792; Last     Rx:41Ltx4077 Ordered   10  LORazepam 1 MG Oral Tablet; One tablet bid prn; Therapy: 68LZV3198 to (Last Rx:09Oct2017) Ordered   11  Multi Vitamin/Minerals TABS; TAKE 1 TABLET DAILY; Therapy: (Recorded:06Mni8706) to Recorded   12  Suprep Bowel Prep Kit 17 5-3 13-1 6 GM/180ML Oral Solution; USE AS DIRECTED; Therapy: 59Bhm8054 to (Last Rx:01Iju8705)  Requested for: 22Gce1895 Ordered   13  TraMADol HCl - 50 MG Oral Tablet; TAKE 1 TABLET EVERY 6 HOURS WITH FOOD; Therapy: 13YCX0919 to (Mirna Das)  Requested for: 29ZEA3572; Last      Rx:04Nov2016 Ordered   14  Vitamin C CAPS; Therapy: (Recorded:85Jfj5904) to Recorded   15  Vitamin E TABS; Therapy: (Recorded:33Khs2507) to Recorded    Allergies   1   No Known Drug Allergies    Vitals   Vital Signs    Recorded: 52WKT7533 10:45AM   Heart Rate 90   Respiration 20   Systolic 643 breath and wheezing.   A broad differential was considered includingasthma, pneumonia, bronchitis, reactive airway disease, pneumothorax, PE, cardiac equivalent, viral induced wheezing, allergic phenomena, etc.  Signs and symptoms are consistent with asthma exacerbation.  Chest x-ray today negative for evidence of pneumonia. He feels improved after interventions here in ED.  There are no signs at this point of any serious etiologies including those mentioned above. He is low risk for PE by Wells Criteria and PERC negative.  There is no chest pain concerning for ACS.  No indication for hospitalization at this time including no hypoxia, no marked increase in respiratory rate, minimal to no retractions.  Supportive outpatient management is indicated, medications for discharge noted below. He was told to complete course of antibiotics. Close followup with primary care physician.  Return if increased wheezing, progressive shortness of breath, fevers or any further concerns.       Diagnosis:    ICD-10-CM    1. Mild intermittent asthma with exacerbation J45.21        Disposition:  discharged to home    Discharge Medications:     Medication List      Started    benzonatate 100 MG capsule  Commonly known as:  TESSALON  100 mg, Oral, 3 TIMES DAILY PRN          Scribe Disclosure:  Sushma CAMPOVERDE, am serving as a scribe at 5:25 PM on 12/19/2018 to document services personally performed by Soraya Su APRN based on my observations and the provider's statements to me.    12/19/2018   [facility] EMERGENCY DEPARTMENT       Soraya Su APRN CNP  12/19/18 1901     Diastolic 78   Weight 143 lb 8 oz   BMI Calculated 30 2   BSA Calculated 1 63     Future Appointments      Date/Time Provider Specialty Site   01/24/2018 09:30 AM ANTHONY Bravo   [facility] [address] [place] [address]     Signatures    Electronically signed by : ANTHONY Vazquez ; Jan 8 2018  5:57PM EST                       (Author)

## 2018-12-19 NOTE — DISCHARGE INSTRUCTIONS
Discharge Instructions  Bronchitis, Pneumonia, Bronchospasm    You were seen today for a chest infection or inflammation. If your provider decided this was due to a bacterial infection, you may need an antibiotic. Sometimes these are caused by a virus, and then an antibiotic will not help.     Generally, every Emergency Department visit should have a follow-up clinic visit with either a primary or a specialty clinic/provider. Please follow-up as instructed by your emergency provider today.    Return to the Emergency Department if:  Your breathing gets much worse.  You are very weak, or feel much more ill.  You develop new symptoms, such as chest pain.  You cough up blood.  You are vomiting (throwing up) enough that you cannot keep fluids or your medicine down.    What can I do to help myself?  Fill any prescriptions the provider gave you and take them right away--especially antibiotics. Be sure to finish the whole antibiotic prescription.  You may be given a prescription for an inhaler, which can help loosen tight air passages.  Use this as needed, but not more often than directed. Inhalers work much better when used with a spacer.   You may be given a prescription for a steroid to reduce inflammation. Used long-term, these can have side effects, but for short-term use they are safe. You may notice restlessness or increased appetite.      You may use non-prescription cough or cold medicines. Cough medicines may help, but don?t make the cough go away completely.   Avoid smoke, because this can make your symptoms worse. If you smoke, this may be a good time to quit! Consider using nicotine lozenges, gum, or patches to reduce cravings.   If you have a fever, Tylenol  (acetaminophen), Motrin  (ibuprofen), or Advil  (ibuprofen) may help bring fever down and may help you feel more comfortable. Be sure to read and follow the package directions, and ask your provider if you have questions.  Be sure to get your flu shot each  year.  For certain ages, the pneumonia shot can help prevent pneumonia.  If you were given a prescription for medicine here today, be sure to read all of the information (including the package insert) that comes with your prescription.  This will include important information about the medicine, its side effects, and any warnings that you need to know about.  The pharmacist who fills the prescription can provide more information and answer questions you may have about the medicine.  If you have questions or concerns that the pharmacist cannot address, please call or return to the Emergency Department.     Remember that you can always come back to the Emergency Department if you are not able to see your regular provider in the amount of time listed above, if you get any new symptoms, or if there is anything that worries you.    Discharge Instructions  Asthma    Asthma is a condition causing narrowing and inflammation of the airways that can make it hard to breathe.  Asthma can also cause cough, wheezing, noisy breathing and tightness in the chest.  Asthma can be brought on or ?triggered? by many things, including dust, mold, pollen, cigarette smoke, exercise, stress and infections (like the common cold).     Generally, every Emergency Department visit should have a follow-up clinic visit with either a primary or a specialty clinic/provider. Please follow-up as instructed by your emergency provider today.    Return to the Emergency Department if:  Your breathing gets worse.  You need to use your inhaler more often than every 4 hours, or cannot get relief from your inhaler.  You are very weak, or feel much more ill.  You develop new symptoms, such as chest pain.  You cough up blood.  You are vomiting (throwing up) enough that you cannot keep fluids or your medicine down.    What can I do to help myself?  Fill any prescriptions the provider gave you and take them right away--especially antibiotics. Be sure to finish the  whole antibiotic prescription.  You may be given a prescription for an inhaler, which can help loosen tight air passages.  Use this as needed, but not more often than directed. Inhalers work much better when used with a spacer.   You may be given a prescription for a steroid to reduce inflammation. Used long-term, these can have some side effects, but for short-term use they are safe. You may notice restlessness or increased appetite (eating more).      You may use non-prescription cough or cold medicines. Cough medicines may help, but do not make the cough go away completely.   Avoid smoke, because this can make you feel worse. If you smoke, this may be a good time to quit! Consider using nicotine lozenges, gum, or patches to reduce cravings.   If you have a fever, Tylenol  (acetaminophen), Motrin  (ibuprofen), or Advil  (ibuprofen), may help bring fever down and may help you feel more comfortable. Be sure to read and follow the package directions, and ask your provider if you have questions.  Be sure to get your flu shot each year.  For certain age groups, the pneumonia shot can help prevent pneumonia.  It is important that you follow up with your regular provider, to be sure that you are improving from this spell (an acute asthma exacerbation), and also to do what you can to keep from having trouble again. Sometimes you need long-term medicines to keep your asthma under control.   If you were given a prescription for medicine here today, be sure to read all of the information (including the package insert) that comes with your prescription.  This will include important information about the medicine, its side effects, and any warnings that you need to know about.  The pharmacist who fills the prescription can provide more information and answer questions you may have about the medicine.  If you have questions or concerns that the pharmacist cannot address, please call or return to the Emergency Department.    Remember that you can always come back to the Emergency Department if you are not able to see your regular provider in the amount of time listed above, if you get any new symptoms, or if there is anything that worries you.

## 2018-12-22 ENCOUNTER — HOSPITAL ENCOUNTER (EMERGENCY)
Facility: CLINIC | Age: 33
Discharge: HOME | End: 2018-12-23
Attending: EMERGENCY MEDICINE | Admitting: EMERGENCY MEDICINE
Payer: COMMERCIAL

## 2018-12-22 DIAGNOSIS — J45.41 MODERATE PERSISTENT ASTHMA WITH ACUTE EXACERBATION: ICD-10-CM

## 2018-12-22 DIAGNOSIS — J20.8 ACUTE VIRAL BRONCHITIS: ICD-10-CM

## 2018-12-22 PROCEDURE — 96375 TX/PRO/DX INJ NEW DRUG ADDON: CPT

## 2018-12-22 PROCEDURE — 99285 EMERGENCY DEPT VISIT HI MDM: CPT | Mod: 25

## 2018-12-22 PROCEDURE — 40000809 ZZH STATISTIC NO DOCUMENTATION TO SUPPORT CHARGE

## 2018-12-22 PROCEDURE — 96365 THER/PROPH/DIAG IV INF INIT: CPT

## 2018-12-22 PROCEDURE — 94640 AIRWAY INHALATION TREATMENT: CPT

## 2018-12-22 RX ORDER — IPRATROPIUM BROMIDE AND ALBUTEROL SULFATE 2.5; .5 MG/3ML; MG/3ML
SOLUTION RESPIRATORY (INHALATION)
Status: DISCONTINUED
Start: 2018-12-22 | End: 2018-12-22 | Stop reason: HOSPADM

## 2018-12-22 NOTE — ED AVS SNAPSHOT
[facility] Emergency Department  [address]  [facility] [address]  Phone:  [phone]  Fax:  [phone]                                    Jignesh Hernandes   MRN: [id number]    Department:  [facility] Emergency Department   Date of Visit:  12/22/2018           After Visit Summary Signature Page    I have received my discharge instructions, and my questions have been answered. I have discussed any challenges I see with this plan with the nurse or doctor.    ..........................................................................................................................................  Patient/Patient Representative Signature      ..........................................................................................................................................  Patient Representative Print Name and Relationship to Patient    ..................................................               ................................................  Date                                   Time    ..........................................................................................................................................  Reviewed by Signature/Title    ...................................................              ..............................................  Date                                               Time          22EPIC Rev 08/18

## 2018-12-23 ENCOUNTER — APPOINTMENT (OUTPATIENT)
Dept: GENERAL RADIOLOGY | Facility: CLINIC | Age: 33
End: 2018-12-23
Attending: EMERGENCY MEDICINE
Payer: COMMERCIAL

## 2018-12-23 VITALS
BODY MASS INDEX: 33.67 KG/M2 | DIASTOLIC BLOOD PRESSURE: 63 MMHG | SYSTOLIC BLOOD PRESSURE: 132 MMHG | WEIGHT: 215 LBS | HEART RATE: 91 BPM | TEMPERATURE: 98.5 F | OXYGEN SATURATION: 94 % | RESPIRATION RATE: 18 BRPM

## 2018-12-23 LAB
ANION GAP SERPL CALCULATED.3IONS-SCNC: 6 MMOL/L (ref 3–14)
BASOPHILS # BLD AUTO: 0.1 10E9/L (ref 0–0.2)
BASOPHILS NFR BLD AUTO: 0.6 %
BUN SERPL-MCNC: 17 MG/DL (ref 7–30)
CALCIUM SERPL-MCNC: 7.9 MG/DL (ref 8.5–10.1)
CHLORIDE SERPL-SCNC: 109 MMOL/L (ref 94–109)
CO2 SERPL-SCNC: 26 MMOL/L (ref 20–32)
CREAT SERPL-MCNC: 0.84 MG/DL (ref 0.66–1.25)
DIFFERENTIAL METHOD BLD: NORMAL
EOSINOPHIL # BLD AUTO: 0.5 10E9/L (ref 0–0.7)
EOSINOPHIL NFR BLD AUTO: 5.1 %
ERYTHROCYTE [DISTWIDTH] IN BLOOD BY AUTOMATED COUNT: 12.2 % (ref 10–15)
GFR SERPL CREATININE-BSD FRML MDRD: >90 ML/MIN/{1.73_M2}
GLUCOSE SERPL-MCNC: 93 MG/DL (ref 70–99)
HCT VFR BLD AUTO: 42.5 % (ref 40–53)
HGB BLD-MCNC: 14.4 G/DL (ref 13.3–17.7)
IMM GRANULOCYTES # BLD: 0 10E9/L (ref 0–0.4)
IMM GRANULOCYTES NFR BLD: 0.3 %
LYMPHOCYTES # BLD AUTO: 2.7 10E9/L (ref 0.8–5.3)
LYMPHOCYTES NFR BLD AUTO: 27.4 %
MCH RBC QN AUTO: 30.1 PG (ref 26.5–33)
MCHC RBC AUTO-ENTMCNC: 33.9 G/DL (ref 31.5–36.5)
MCV RBC AUTO: 89 FL (ref 78–100)
MONOCYTES # BLD AUTO: 0.8 10E9/L (ref 0–1.3)
MONOCYTES NFR BLD AUTO: 7.7 %
NEUTROPHILS # BLD AUTO: 5.8 10E9/L (ref 1.6–8.3)
NEUTROPHILS NFR BLD AUTO: 58.9 %
NRBC # BLD AUTO: 0 10*3/UL
NRBC BLD AUTO-RTO: 0 /100
PLATELET # BLD AUTO: 254 10E9/L (ref 150–450)
POTASSIUM SERPL-SCNC: 3.7 MMOL/L (ref 3.4–5.3)
RBC # BLD AUTO: 4.78 10E12/L (ref 4.4–5.9)
SODIUM SERPL-SCNC: 141 MMOL/L (ref 133–144)
WBC # BLD AUTO: 9.8 10E9/L (ref 4–11)

## 2018-12-23 PROCEDURE — 25000125 ZZHC RX 250

## 2018-12-23 PROCEDURE — 80048 BASIC METABOLIC PNL TOTAL CA: CPT | Performed by: EMERGENCY MEDICINE

## 2018-12-23 PROCEDURE — 71046 X-RAY EXAM CHEST 2 VIEWS: CPT

## 2018-12-23 PROCEDURE — 96375 TX/PRO/DX INJ NEW DRUG ADDON: CPT

## 2018-12-23 PROCEDURE — 40000275 ZZH STATISTIC RCP TIME EA 10 MIN

## 2018-12-23 PROCEDURE — 25000128 H RX IP 250 OP 636: Performed by: EMERGENCY MEDICINE

## 2018-12-23 PROCEDURE — 94644 CONT INHLJ TX 1ST HOUR: CPT

## 2018-12-23 PROCEDURE — 85025 COMPLETE CBC W/AUTO DIFF WBC: CPT | Performed by: EMERGENCY MEDICINE

## 2018-12-23 PROCEDURE — 25000125 ZZHC RX 250: Performed by: EMERGENCY MEDICINE

## 2018-12-23 PROCEDURE — 96361 HYDRATE IV INFUSION ADD-ON: CPT

## 2018-12-23 PROCEDURE — 96365 THER/PROPH/DIAG IV INF INIT: CPT

## 2018-12-23 RX ORDER — IPRATROPIUM BROMIDE AND ALBUTEROL SULFATE 2.5; .5 MG/3ML; MG/3ML
SOLUTION RESPIRATORY (INHALATION)
Status: COMPLETED
Start: 2018-12-23 | End: 2018-12-23

## 2018-12-23 RX ORDER — ALBUTEROL SULFATE 5 MG/ML
10 SOLUTION, NON-ORAL INHALATION CONTINUOUS
Status: DISCONTINUED | OUTPATIENT
Start: 2018-12-23 | End: 2018-12-23 | Stop reason: HOSPADM

## 2018-12-23 RX ORDER — PREDNISONE 20 MG/1
TABLET ORAL
Qty: 15 TABLET | Refills: 0 | Status: SHIPPED | OUTPATIENT
Start: 2018-12-23 | End: 2018-12-30

## 2018-12-23 RX ORDER — METHYLPREDNISOLONE SODIUM SUCCINATE 125 MG/2ML
125 INJECTION, POWDER, LYOPHILIZED, FOR SOLUTION INTRAMUSCULAR; INTRAVENOUS ONCE
Status: COMPLETED | OUTPATIENT
Start: 2018-12-23 | End: 2018-12-23

## 2018-12-23 RX ORDER — ALBUTEROL SULFATE 90 UG/1
2 AEROSOL, METERED RESPIRATORY (INHALATION) EVERY 6 HOURS PRN
Qty: 2 INHALER | Refills: 0 | Status: SHIPPED | OUTPATIENT
Start: 2018-12-23 | End: 2019-01-22

## 2018-12-23 RX ORDER — IPRATROPIUM BROMIDE AND ALBUTEROL SULFATE 2.5; .5 MG/3ML; MG/3ML
3 SOLUTION RESPIRATORY (INHALATION)
Status: ACTIVE | OUTPATIENT
Start: 2018-12-23 | End: 2018-12-23

## 2018-12-23 RX ADMIN — METHYLPREDNISOLONE SODIUM SUCCINATE 125 MG: 125 INJECTION, POWDER, FOR SOLUTION INTRAMUSCULAR; INTRAVENOUS at 00:18

## 2018-12-23 RX ADMIN — IPRATROPIUM BROMIDE AND ALBUTEROL SULFATE 3 ML: .5; 3 SOLUTION RESPIRATORY (INHALATION) at 00:13

## 2018-12-23 RX ADMIN — Medication 2 G: at 00:25

## 2018-12-23 RX ADMIN — ALBUTEROL SULFATE 10 MG/HR: 5 SOLUTION RESPIRATORY (INHALATION) at 00:51

## 2018-12-23 RX ADMIN — SODIUM CHLORIDE 1000 ML: 9 INJECTION, SOLUTION INTRAVENOUS at 00:10

## 2018-12-23 ASSESSMENT — ENCOUNTER SYMPTOMS
SHORTNESS OF BREATH: 1
COUGH: 1
WHEEZING: 1
FEVER: 1

## 2018-12-23 NOTE — DISCHARGE INSTRUCTIONS
Discharge Instructions  Asthma    Asthma is a condition causing narrowing and inflammation of the airways that can make it hard to breathe.  Asthma can also cause cough, wheezing, noisy breathing and tightness in the chest.  Asthma can be brought on or ?triggered? by many things, including dust, mold, pollen, cigarette smoke, exercise, stress and infections (like the common cold).     Generally, every Emergency Department visit should have a follow-up clinic visit with either a primary or a specialty clinic/provider. Please follow-up as instructed by your emergency provider today.    Return to the Emergency Department if:  Your breathing gets worse.  You need to use your inhaler more often than every 4 hours, or cannot get relief from your inhaler.  You are very weak, or feel much more ill.  You develop new symptoms, such as chest pain.  You cough up blood.  You are vomiting (throwing up) enough that you cannot keep fluids or your medicine down.    What can I do to help myself?  Fill any prescriptions the provider gave you and take them right away--especially antibiotics. Be sure to finish the whole antibiotic prescription.  You may be given a prescription for an inhaler, which can help loosen tight air passages.  Use this as needed, but not more often than directed. Inhalers work much better when used with a spacer.   You may be given a prescription for a steroid to reduce inflammation. Used long-term, these can have some side effects, but for short-term use they are safe. You may notice restlessness or increased appetite (eating more).      You may use non-prescription cough or cold medicines. Cough medicines may help, but do not make the cough go away completely.   Avoid smoke, because this can make you feel worse. If you smoke, this may be a good time to quit! Consider using nicotine lozenges, gum, or patches to reduce cravings.   If you have a fever, Tylenol  (acetaminophen), Motrin  (ibuprofen), or Advil   (ibuprofen), may help bring fever down and may help you feel more comfortable. Be sure to read and follow the package directions, and ask your provider if you have questions.  Be sure to get your flu shot each year.  For certain age groups, the pneumonia shot can help prevent pneumonia.  It is important that you follow up with your regular provider, to be sure that you are improving from this spell (an acute asthma exacerbation), and also to do what you can to keep from having trouble again. Sometimes you need long-term medicines to keep your asthma under control.   If you were given a prescription for medicine here today, be sure to read all of the information (including the package insert) that comes with your prescription.  This will include important information about the medicine, its side effects, and any warnings that you need to know about.  The pharmacist who fills the prescription can provide more information and answer questions you may have about the medicine.  If you have questions or concerns that the pharmacist cannot address, please call or return to the Emergency Department.   Remember that you can always come back to the Emergency Department if you are not able to see your regular provider in the amount of time listed above, if you get any new symptoms, or if there is anything that worries you.

## 2018-12-23 NOTE — ED PROVIDER NOTES
History     Chief Complaint:  Shortness of breath      HPI   Jignesh Hernandes is a 33 year old male with a history of asthma who presents with shortness of breath and cough. The patient has had a cough and shortness of breath for the last few days, which has worsened in the last four hours. He also reports a low-grade subjective fever. The patient was seen here in the ED a month ago and diagnosed with pneumonia and prescribed an inhaler, antibiotics, and steroids. His insurance did not cover the steroids so he never filled that prescription and he used the inhaler in the last month until it ran out recently.     Allergies:  Amoxicillin     Medications:    Proair  Celexa  Lorazepam  Remeron     Past Medical History:    GERD  Asthma    Past Surgical History:    The patient does not have any pertinent past surgical history.    Family History:    No past pertinent family history.    Social History:  Patient presents alone  Former smoker  Positive for alcohol use.   Positive for marijuana use  Marital Status:       Review of Systems   Constitutional: Positive for fever.   Respiratory: Positive for cough, shortness of breath and wheezing.    All other systems reviewed and are negative.      Physical Exam     Patient Vitals for the past 24 hrs:   BP Temp Temp src Pulse Resp SpO2 Weight   12/23/18 0505 -- -- -- 91 18 94 % --   12/23/18 0346 -- -- -- -- -- 92 % --   12/23/18 0245 132/63 -- -- 98 -- 91 % --   12/23/18 0158 -- -- -- -- -- 93 % --   12/23/18 0035 -- 98.5  F (36.9  C) Oral -- -- -- --   12/22/18 2357 129/103 -- -- 111 30 93 % 97.5 kg (215 lb)     Physical Exam  Nursing note and vitals reviewed.  Constitutional: Cooperative. Increased work of breathing.   HENT:   Mouth/Throat: Mucous membranes are dry.   Cardiovascular: Tachycardic, regular rhythm and normal heart sounds.  No murmur.  Pulmonary/Chest: Tachypneic. Poor overall air movement. Diffuse expiratory wheezes with prolonged expiratory  phase  Abdominal: Soft. Normal appearance and bowel sounds are normal. No distension. There is no tenderness. There is no rigidity and no guarding.   Musculoskeletal: No LE edema  Neurological: Alert. Oriented x4  Skin: Skin is warm and dry.   Psychiatric: Normal mood and affect.     Emergency Department Course   Imaging:  Radiographic findings were communicated with the patient who voiced understanding of the findings.  X-ray Chest, 2 views:  No evidence of active cardiopulmonary disease.  Result per radiology.    Laboratory:  CBC: WBC: 9.8, HGB: 14.4, PLT: 254  BMP: Calcium: 7.9 (L), o/w WNL (Creatinine: 0.84)(glucose 93)    Interventions:  0003 - Albuterol-Ipratropium 2.5mg-0.5mg/3ml, inhalation solution, Nebulizer   0010 - NS 1L IV  0010 - Magnesium sulfate 2 g IV  0015 - Albuterol-Ipratropium 2.5mg-0.5mg/3ml, inhalation solution, Nebulizer  0018 - solu-Medrol 125 mg IV  0026 - Albuterol-Ipratropium 2.5mg-0.5mg/3ml, inhalation solution, Nebulizer  0038 - Albuterol neb continuous 1 hour    Emergency Department Course:  Nursing notes and vitals reviewed.  0002: I performed an exam of the patient as documented above. IV inserted and blood drawn. Labs ordered.     0038: Peak flow 225 and placed on continuous albuterol for an hour.    0141: I rechecked the patient. Explained findings to patient.    0445: I rechecked the patient. Findings and plan explained to the Patient. Patient discharged home with instructions regarding supportive care, medications, and reasons to return. The importance of close follow-up was reviewed.       Impression & Plan    Medical Decision Making:  Jignesh Hernandes is a 33 year old male with moderate persistent asthma who presents with exacerbation. Complicating this is his inability to get his preventative or supportive medications filled. He was initially working to breath and received 3 duonebs as well as an hour of continuous albuterol therapy. Magnesium and solu-medrol were also  administered with slow improvement of his symptoms. On recheck after observation in the ED for 5 hours he is resting much more comfortably. Oxygen sats are 93%. Chest x-ray shows no pneumonia. I have low clinical suspicion for a pulmonary embolism and it does look like he has had this worked up in the past for these severe exacerbations. He simply needs to be on corticosteroids to reduce the inflammation in his lungs. He does not require hospitalization at this time as he has shown the ability to maintain his sats. We will make sure he gets his medications filled prior to leaving the hospital. He is understanding of this and comfortable with return precautions and will follow up with his regular physician.      Diagnosis:    ICD-10-CM    1. Acute viral bronchitis J20.8    2. Moderate persistent asthma with acute exacerbation J45.41        Disposition:  discharged to home    Discharge Medications:     Medication List      Started    predniSONE 20 MG tablet  Commonly known as:  DELTASONE  Take three tablets (= 60mg) each day for 5 (five) days        Modified    * albuterol 108 (90 Base) MCG/ACT inhaler  Commonly known as:  PROAIR HFA/PROVENTIL HFA/VENTOLIN HFA  2 puffs, Inhalation, EVERY 6 HOURS PRN  What changed:  Another medication with the same name was added. Make sure you understand how and when to take each.     * albuterol 108 (90 Base) MCG/ACT inhaler  Commonly known as:  PROAIR HFA/PROVENTIL HFA/VENTOLIN HFA  2 puffs, Inhalation, EVERY 6 HOURS PRN  What changed:  You were already taking a medication with the same name, and this prescription was added. Make sure you understand how and when to take each.         * This list has 2 medication(s) that are the same as other medications prescribed for you. Read the directions carefully, and ask your doctor or other care provider to review them with you.                I, Brian Good, am serving as a scribe on 12/23/2018 at 12:02 AM to personally document services  performed by Juanjo Govea MD based on my observations and the provider's statements to me.         Brian Good  12/22/2018   [facility] EMERGENCY DEPARTMENT       Juanjo Govea MD  12/23/18 0636

## 2020-10-12 ENCOUNTER — APPOINTMENT (OUTPATIENT)
Dept: GENERAL RADIOLOGY | Facility: CLINIC | Age: 35
End: 2020-10-12
Attending: EMERGENCY MEDICINE
Payer: COMMERCIAL

## 2020-10-12 ENCOUNTER — HOSPITAL ENCOUNTER (EMERGENCY)
Facility: CLINIC | Age: 35
Discharge: HOME | End: 2020-10-12
Attending: EMERGENCY MEDICINE | Admitting: EMERGENCY MEDICINE
Payer: COMMERCIAL

## 2020-10-12 VITALS
SYSTOLIC BLOOD PRESSURE: 143 MMHG | OXYGEN SATURATION: 93 % | BODY MASS INDEX: 32.96 KG/M2 | WEIGHT: 210 LBS | DIASTOLIC BLOOD PRESSURE: 92 MMHG | RESPIRATION RATE: 18 BRPM | HEART RATE: 82 BPM | TEMPERATURE: 97.8 F | HEIGHT: 67 IN

## 2020-10-12 DIAGNOSIS — Z20.822 SUSPECTED COVID-19 VIRUS INFECTION: ICD-10-CM

## 2020-10-12 PROCEDURE — U0003 INFECTIOUS AGENT DETECTION BY NUCLEIC ACID (DNA OR RNA); SEVERE ACUTE RESPIRATORY SYNDROME CORONAVIRUS 2 (SARS-COV-2) (CORONAVIRUS DISEASE [COVID-19]), AMPLIFIED PROBE TECHNIQUE, MAKING USE OF HIGH THROUGHPUT TECHNOLOGIES AS DESCRIBED BY CMS-2020-01-R: HCPCS | Performed by: EMERGENCY MEDICINE

## 2020-10-12 PROCEDURE — C9803 HOPD COVID-19 SPEC COLLECT: HCPCS

## 2020-10-12 PROCEDURE — 71045 X-RAY EXAM CHEST 1 VIEW: CPT

## 2020-10-12 PROCEDURE — 99284 EMERGENCY DEPT VISIT MOD MDM: CPT | Mod: 25

## 2020-10-12 ASSESSMENT — ENCOUNTER SYMPTOMS
FATIGUE: 1
CHILLS: 1
COUGH: 1
ABDOMINAL PAIN: 0
FEVER: 0
MYALGIAS: 1
SHORTNESS OF BREATH: 0

## 2020-10-12 ASSESSMENT — MIFFLIN-ST. JEOR: SCORE: 1846.18

## 2020-10-12 NOTE — ED AVS SNAPSHOT
[facility]  [address] [address]  Phone: [phone]  Fax: [phone]                                    Jignesh Hernandes   MRN: [id number]    Department: [facility]   Date of Visit: 10/12/2020           After Visit Summary Signature Page    I have received my discharge instructions, and my questions have been answered. I have discussed any challenges I see with this plan with the nurse or doctor.    ..........................................................................................................................................  Patient/Patient Representative Signature      ..........................................................................................................................................  Patient Representative Print Name and Relationship to Patient    ..................................................               ................................................  Date                                   Time    ..........................................................................................................................................  Reviewed by Signature/Title    ...................................................              ..............................................  Date                                               Time          22EPIC Rev 08/18

## 2020-10-12 NOTE — DISCHARGE INSTRUCTIONS
"Discharge Instructions for COVID-19 Patients  You have--or may have--COVID-19. Please follow the instructions listed below.   If you have a weakened immune system, discuss with your doctor any other actions you need to take.  How can I protect others?  If you have symptoms (fever, cough, body aches or trouble breathing):  Stay home and away from others (self-isolate) until:  At least 10 days have passed since your symptoms started, And   You've had no fever--and no medicine that reduces fever--for 1 full day (24 hours), And    Your other symptoms have resolved (gotten better).  If you don't show symptoms, but testing showed that you have COVID-19:  Stay home and away from others (self-isolate). Follow the tips under \"How do I self-isolate?\" below for 10 days (20 days if you have a weak immune system).  You don't need to be retested for COVID-19 before going back to school or work. As long as you're fever-free and feeling better, you can go back to school, work and other activities after waiting the 10 or 20 days.   How do I self-isolate?  Stay in your own room, even for meals. Use your own bathroom if you can.  Stay away from others in your home. No hugging, kissing or shaking hands. No visitors.  Don't go to work, school or anywhere else.  Clean \"high touch\" surfaces often (doorknobs, counters, handles). Use household cleaning spray or wipes. You'll find a full list of  on the EPA website: www.epa.gov/pesticide-registration/list-n-disinfectants-use-against-sars-cov-2.  Cover your mouth and nose with a mask or other face covering to avoid spreading germs.  Wash your hands and face often. Use soap and water.  Caregivers in these groups are at risk for severe illness due to COVID-19:  People 65 years and older  People who live in a nursing home or long-term care facility  People with chronic disease (lung, heart, cancer, diabetes, kidney, liver, immunologic)  People who have a weakened immune system, including " those who:  Are in cancer treatment  Take medicine that weakens the immune system, such as corticosteroids  Had a bone marrow or organ transplant  Have an immune deficiency  Have poorly controlled HIV or AIDS  Are obese (body mass index of 40 or higher)  Smoke regularly  Caregivers should wear gloves while washing dishes, handling laundry and cleaning bedrooms and bathrooms.  Use caution when washing and drying laundry: Don't shake dirty laundry and use the warmest water setting that you can.  For more tips on managing your health at home, go to www.cdc.gov/coronavirus/2019-ncov/downloads/10Things.pdf.  How can I take care of myself at home?  Get lots of rest. Drink extra fluids (unless a doctor has told you not to).    Take Tylenol (acetaminophen) for fever or pain. If you have liver or kidney problems, ask your family doctor if it's okay to take Tylenol.     Adults can take either:  650 mg (two 325 mg pills) every 4 to 6 hours, or   1,000 mg (two 500 mg pills) every 8 hours as needed.  Note: Don't take more than 3,000 mg in one day. Acetaminophen is found in many medicines (both prescribed and over-the-counter medicines). Read all labels to be sure you don't take too much.   For children, check the Tylenol bottle for the right dose. The dose is based on the child's age or weight.  If you have other health problems (like cancer, heart failure, an organ transplant or severe kidney disease): Call your specialty clinic if you don't feel better in the next 2 days.    Know when to call 911. Emergency warning signs include:  Trouble breathing or shortness of breath  Pain or pressure in the chest that doesn't go away  Feeling confused like you haven't felt before, or not being able to wake up  Bluish-colored lips or face    Your doctor may have prescribed a blood thinner medicine. Follow their instructions.  Where can I get more information?   [facility] - About COVID-19: [email or web address]  [facility] - What to  Do If You're Sick: www.cdc.gov/coronavirus/2019-ncov/about/steps-when-sick.html  CDC - Ending Home Isolation: www.cdc.gov/coronavirus/2019-ncov/hcp/disposition-in-home-patients.html  CDC - Caring for Someone: www.cdc.gov/coronavirus/2019-ncov/if-you-are-sick/care-for-someone.html  [facility] - Interim Guidance for Hospital Discharge to Home: www.[facility].mn./diseases/coronavirus/hcp/hospdischarge.pdf  [facility] clinical trials (COVID-19 research studies): [email or web address]-clinical-trials  Below are the COVID-19 hotlines at the [place] Department of Health ([facility]). Interpreters are available.  For health questions: Call 636-541-6268 or 1-207.674.6881 (7 a.m. to 7 p.m.)  For questions about schools and childcare: Call 483-621-3710 or 1-426.741.6366 (7 a.m. to 7 p.m.)    For informational purposes only. Not to replace the advice of your health care provider. Clinically reviewed by the Infection Prevention Team. Copyright   2020 [facility]. All rights reserved. [employer] 859140 - REV 08/04/20.

## 2020-10-12 NOTE — LETTER
October 12, 2020      To Whom It May Concern:      Jignesh Hernandes was seen in our Emergency Department today, 10/12/20, with COVID pending results. Please excuse Mr. Hernandes from work until results come back negative and is without symptoms.       Sincerely,        Noris Loo RN

## 2020-10-12 NOTE — ED PROVIDER NOTES
"  History     Chief Complaint:  Cough    HPI   Jignesh Hernandes is a 35 year old male with a history of asthma who presents to the emergency department for evaluation of a persistent cough for the past 3 days. The patient reports that he has been having a persistent productive cough for the past 3 days. He also mentions having some body aches, fatigue, and generalized weakness with subjective chills for the last 3 days. The patient has a history of asthma and is a former smoker. He has no known COVID exposure, but does work at the [employer] in [place]. He otherwise denies any fever.     Allergies:  Amoxicillin    Medications:    Albuterol  Celexa  Antabuse  Lorazepam  Remeron  Ativan    Past Medical History:    GERD  Asthma  Dermatitis  Suicide attempt     Past Surgical History:    The patient does not have any pertinent past surgical history.    Family History:    Father: parkinsonism  Mother: anxiety, bipolar disorder    Social History:  Smoking Status: Former  Smokeless Tobacco: Never  Alcohol Use: Yes  Drug Use: Yes; marijuana  Marital Status:        Review of Systems   Constitutional: Positive for chills and fatigue. Negative for fever.   Respiratory: Positive for cough. Negative for shortness of breath.    Cardiovascular: Negative for chest pain.   Gastrointestinal: Negative for abdominal pain.   Musculoskeletal: Positive for myalgias.   All other systems reviewed and are negative.      Physical Exam   Vitals:  Patient Vitals for the past 24 hrs:   BP Temp Temp src Pulse Resp SpO2 Height Weight   10/12/20 1257 (!) 141/77 97.8  F (36.6  C) Temporal 106 18 95 % 1.702 m (5' 7\") 95.3 kg (210 lb)     Physical Exam  General: Alert and cooperative with exam. Patient in mild distress. Normal mentation. overweight  Head:  Scalp is NC/AT  Eyes:  No scleral icterus, PERRL  ENT:  The external nose and ears are normal.   Neck:  Normal range of motion without rigidity.  CV:  Regular rate and rhythm  Resp:  Breath sounds are " clear bilaterally    Non-labored, no retractions or accessory muscle use    Moderate cough  GI:  Abdomen is soft, no distension, no tenderness. No peritoneal signs  MS:  No lower extremity edema   Skin:  Warm and dry, No rash or lesions noted.  Neuro: Oriented x 3. No gross motor deficits.       Emergency Department Course   Imaging:  Radiographic findings were communicated with the patient who voiced understanding of the findings.    XR Chest Port 1 View   Preliminary Result   IMPRESSION: No active cardiopulmonary disease or change since the   prior exam.          Laboratory:  Symptomatic COVID-19 Virus by PCR: Pending    Emergency Department Course:  Past medical records, nursing notes, and vitals reviewed.    1407 I performed an exam of the patient as documented above.     The patient was sent for a chest XR while in the emergency department, results above.     1500 I rechecked the patient and discussed the results of his workup thus far.     Findings and plan explained to the Patient. Patient discharged home with instructions regarding supportive care, medications, and reasons to return. The importance of close follow-up was reviewed.     I personally reviewed the imaging results with the Patient and answered all related questions prior to discharge.      Impression & Plan      Covid-19  Jignesh Hernandes was evaluated during a global COVID-19 pandemic, which necessitated consideration that the patient might be at risk for infection with the SARS-CoV-2 virus that causes COVID-19.   Applicable protocols for evaluation were followed during the patient's care.   COVID-19 was considered as part of the patient's evaluation. The plan for testing is:  a test was obtained during this visit.     Medical Decision Making:  Jignesh Hernandes is a 35 year old male who comes today with symptoms that are concerning for possible COVID19.  Vital signs are normal with exception of mild hypertension.  The patient is not hypoxic.  The  patient's work of breathing is normal.  Mentation is normal.  The patient does not require hospitalization.  At this time, I believe the patient is safe for discharge home.  The patient was given the current [place] Department of Health guidelines on self isolation.  Supportive care was discussed.  Return precautions discussed.  Patient discharged home.      Diagnosis:    ICD-10-CM    1. Suspected COVID-19 virus infection  Z20.828        Disposition:  discharged to home      IPhillip, am serving as a scribe on 10/12/2020 at 1:39 PM to personally document services performed by Tay Smith DO based on my observations and the provider's statements to me.    Phillip Oleary  10/12/2020   [facility] EMERGENCY DEPT       Tay Smith DO  10/12/20 4536

## 2020-10-13 LAB
SARS-COV-2 RNA SPEC QL NAA+PROBE: ABNORMAL
SPECIMEN SOURCE: ABNORMAL

## 2020-10-14 ENCOUNTER — TELEPHONE (OUTPATIENT)
Dept: EMERGENCY MEDICINE | Facility: CLINIC | Age: 35
End: 2020-10-14

## 2020-10-14 NOTE — TELEPHONE ENCOUNTER
"Coronavirus (COVID-19) Notification    Caller Name (Patient, parent, daughter/son, grandparent, etc)  Patient    Reason for call  Notify of Positive Coronavirus (COVID-19) lab results, assess symptoms,  review [facility] recommendations    Lab Result    Lab test:  2019-nCoV rRt-PCR or SARS-CoV-2 PCR    Oropharyngeal AND/OR nasopharyngeal swabs is POSITIVE for 2019-nCoV RNA/SARS-COV-2 PCR (COVID-19 virus)    RN Recommendations/Instructions per [facility] Coronavirus COVID-19 recommendations    Brief introduction script  Introduce self then review script:  \"I am calling on behalf of [employer].  We were notified that your Coronavirus test (COVID-19) for was POSITIVE for the virus.  I have some information to relay to you but first I wanted to mention that the [place] Dept of Health will be contacting you shortly [it's possible MD already called Patient] to talk to you more about how you are feeling and other people you have had contact with who might now also have the virus.  Also, [facility] is Partnering with the [facility] for Covid-19 research, you may be contacted directly by research staff.\"    Assessment (Inquire about Patient's current symptoms)   Assessment   Current Symptoms at time of phone call: (if no symptoms, document No symptoms]  weak, tired   Symptoms onset (if applicable)  10/10/20     If at time of call, Patients symptoms hare worsened, the Patient should contact 911 or have someone drive them to Emergency Dept promptly:      If Patient calling 911, inform 911 personal that you have tested positive for the Coronavirus (COVID-19).  Place mask on and await 911 to arrive.    If Emergency Dept, If possible, please have another adult drive you to the Emergency Dept but you need to wear mask when in contact with other people.      Review information with Patient    Your result was positive. This means you have COVID-19 (coronavirus).  We have sent you a letter that reviews " the information that I'll be reviewing with you now.    How can I protect others?    If you have symptoms: stay home and away from others (self-isolate) until:    You've had no fever--and no medicine that reduces fever--for 1 full day (24 hours). And       Your other symptoms have gotten better. For example, your cough or breathing has improved. And     At least 10 days have passed since your symptoms started. (If you've been told by a doctor that you have a weak immune system, wait 20 days.)     If you don't have symptoms: Stay home and away from others (self-isolate) until at least 10 days have passed since your first positive COVID-19 test. (Date test collected)    During this time:    Stay in your own room, including for meals. Use your own bathroom if you can.    Stay away from others in your home. No hugging, kissing or shaking hands. No visitors.     Don't go to work, school or anywhere else.     Clean  high touch  surfaces often (doorknobs, counters, handles, etc.). Use a household cleaning spray or wipes. You'll find a full list on the EPA website at www.epa.gov/pesticide-registration/list-n-disinfectants-use-against-sars-cov-2.     Cover your mouth and nose with a mask, tissue or other face covering to avoid spreading germs.    Wash your hands and face often with soap and water.    Caregivers in these groups are at risk for severe illness due to COVID-19:  o People 65 years and older  o People who live in a nursing home or long-term care facility  o People with chronic disease (lung, heart, cancer, diabetes, kidney, liver, immunologic)  o People who have a weakened immune system, including those who:  - Are in cancer treatment  - Take medicine that weakens the immune system, such as corticosteroids  - Had a bone marrow or organ transplant  - Have an immune deficiency  - Have poorly controlled HIV or AIDS  - Are obese (body mass index of 40 or higher)  - Smoke regularly    Caregivers should wear gloves  while washing dishes, handling laundry and cleaning bedrooms and bathrooms.    Wash and dry laundry with special caution. Don't shake dirty laundry, and use the warmest water setting you can.    If you have a weakened immune system, ask your doctor about other actions you should take.    For more tips, go to www.cdc.gov/coronavirus/2019-ncov/downloads/10Things.pdf.    You should not go back to work until you meet the guidelines above for ending your home isolation. You don't need to be retested for COVID-19 before going back to work--studies show that you won't spread the virus if it's been at least 10 days since your symptoms started (or 20 days, if you have a weak immune system).    Employers: This document serves as formal notice of your employee's medical guidelines for going back to work. They must meet the above guidelines before going back to work in person.    How can I take care of myself?    1. Get lots of rest. Drink extra fluids (unless a doctor has told you not to).    2. Take Tylenol (acetaminophen) for fever or pain. If you have liver or kidney problems, ask your family doctor if it's okay to take Tylenol.     Take either:     650 mg (two 325 mg pills) every 4 to 6 hours, or     1,000 mg (two 500 mg pills) every 8 hours as needed.     Note: Don't take more than 3,000 mg in one day. Acetaminophen is found in many medicines (both prescribed and over-the-counter medicines). Read all labels to be sure you don't take too much.    For children, check the Tylenol bottle for the right dose (based on their age or weight).    3. If you have other health problems (like cancer, heart failure, an organ transplant or severe kidney disease): Call your specialty clinic if you don't feel better in the next 2 days.    4. Know when to call 911: Emergency warning signs include:    Trouble breathing or shortness of breath    Pain or pressure in the chest that doesn't go away    Feeling confused like you haven't felt  before, or not being able to wake up    Bluish-colored lips or face    5. Sign up for "[employer]. We know it's scary to hear that you have COVID-19. We want to track your symptoms to make sure you're okay over the next 2 weeks. Please look for an email from "[employer]--this is a free, online program that we'll use to keep in touch. To sign up, follow the link in the email. Learn more at [email or web address].    Where can I get more information?    [facility]: [email or web address]    Coronavirus Basics: www.health.[facility].[place]./diseases/coronavirus/basics.html    What to Do If You're Sick: www.cdc.gov/coronavirus/2019-ncov/about/steps-when-sick.html    Ending Home Isolation: www.cdc.gov/coronavirus/2019-ncov/hcp/disposition-in-home-patients.html     Caring for Someone with COVID-19: www.cdc.gov/coronavirus/2019-ncov/if-you-are-sick/care-for-someone.html     [facility] clinical trials (COVID-19 research studies): clinicalaffairs.[facility]-clinical-trials     A Positive COVID-19 letter will be sent via [employer] or the mail. (Exception, no letters sent to Presurgerical/Preprocedure Patients)    [Name]  Joshua Chun RN  [employer] - [facility]  COVID19 Results Team RN  Ph# [phone]

## 2021-01-14 ENCOUNTER — HEALTH MAINTENANCE LETTER (OUTPATIENT)
Age: 36
End: 2021-01-14

## 2021-10-24 ENCOUNTER — HEALTH MAINTENANCE LETTER (OUTPATIENT)
Age: 36
End: 2021-10-24

## 2022-02-13 ENCOUNTER — HEALTH MAINTENANCE LETTER (OUTPATIENT)
Age: 37
End: 2022-02-13

## 2022-10-15 ENCOUNTER — HEALTH MAINTENANCE LETTER (OUTPATIENT)
Age: 37
End: 2022-10-15

## 2023-03-26 ENCOUNTER — HEALTH MAINTENANCE LETTER (OUTPATIENT)
Age: 38
End: 2023-03-26

## 2023-04-28 ENCOUNTER — HOSPITAL ENCOUNTER (EMERGENCY)
Facility: CLINIC | Age: 38
Discharge: HOME | End: 2023-04-28
Attending: EMERGENCY MEDICINE | Admitting: EMERGENCY MEDICINE
Payer: COMMERCIAL

## 2023-04-28 ENCOUNTER — APPOINTMENT (OUTPATIENT)
Dept: CT IMAGING | Facility: CLINIC | Age: 38
End: 2023-04-28
Attending: EMERGENCY MEDICINE
Payer: COMMERCIAL

## 2023-04-28 VITALS
HEART RATE: 83 BPM | WEIGHT: 210 LBS | OXYGEN SATURATION: 94 % | TEMPERATURE: 98.5 F | SYSTOLIC BLOOD PRESSURE: 138 MMHG | BODY MASS INDEX: 32.89 KG/M2 | DIASTOLIC BLOOD PRESSURE: 80 MMHG | RESPIRATION RATE: 18 BRPM

## 2023-04-28 DIAGNOSIS — I88.0 MESENTERIC ADENITIS: ICD-10-CM

## 2023-04-28 LAB
ALBUMIN SERPL BCG-MCNC: 4.4 G/DL (ref 3.5–5.2)
ALP SERPL-CCNC: 82 U/L (ref 40–129)
ALT SERPL W P-5'-P-CCNC: 59 U/L (ref 10–50)
ANION GAP SERPL CALCULATED.3IONS-SCNC: 9 MMOL/L (ref 7–15)
AST SERPL W P-5'-P-CCNC: 44 U/L (ref 10–50)
BASOPHILS # BLD AUTO: 0 10E3/UL (ref 0–0.2)
BASOPHILS NFR BLD AUTO: 1 %
BILIRUB SERPL-MCNC: 0.4 MG/DL
BUN SERPL-MCNC: 14.1 MG/DL (ref 6–20)
CALCIUM SERPL-MCNC: 9.5 MG/DL (ref 8.6–10)
CHLORIDE SERPL-SCNC: 103 MMOL/L (ref 98–107)
CREAT SERPL-MCNC: 0.94 MG/DL (ref 0.67–1.17)
DEPRECATED HCO3 PLAS-SCNC: 27 MMOL/L (ref 22–29)
EOSINOPHIL # BLD AUTO: 0.4 10E3/UL (ref 0–0.7)
EOSINOPHIL NFR BLD AUTO: 7 %
ERYTHROCYTE [DISTWIDTH] IN BLOOD BY AUTOMATED COUNT: 12.8 % (ref 10–15)
GFR SERPL CREATININE-BSD FRML MDRD: >90 ML/MIN/1.73M2
GLUCOSE SERPL-MCNC: 120 MG/DL (ref 70–99)
HCT VFR BLD AUTO: 45.7 % (ref 40–53)
HGB BLD-MCNC: 15.3 G/DL (ref 13.3–17.7)
IMM GRANULOCYTES # BLD: 0 10E3/UL
IMM GRANULOCYTES NFR BLD: 0 %
LIPASE SERPL-CCNC: 28 U/L (ref 13–60)
LYMPHOCYTES # BLD AUTO: 1.8 10E3/UL (ref 0.8–5.3)
LYMPHOCYTES NFR BLD AUTO: 34 %
MCH RBC QN AUTO: 29 PG (ref 26.5–33)
MCHC RBC AUTO-ENTMCNC: 33.5 G/DL (ref 31.5–36.5)
MCV RBC AUTO: 87 FL (ref 78–100)
MONOCYTES # BLD AUTO: 0.4 10E3/UL (ref 0–1.3)
MONOCYTES NFR BLD AUTO: 8 %
NEUTROPHILS # BLD AUTO: 2.7 10E3/UL (ref 1.6–8.3)
NEUTROPHILS NFR BLD AUTO: 50 %
NRBC # BLD AUTO: 0 10E3/UL
NRBC BLD AUTO-RTO: 0 /100
PLATELET # BLD AUTO: 224 10E3/UL (ref 150–450)
POTASSIUM SERPL-SCNC: 4.1 MMOL/L (ref 3.4–5.3)
PROT SERPL-MCNC: 7.5 G/DL (ref 6.4–8.3)
RBC # BLD AUTO: 5.28 10E6/UL (ref 4.4–5.9)
SODIUM SERPL-SCNC: 139 MMOL/L (ref 136–145)
WBC # BLD AUTO: 5.3 10E3/UL (ref 4–11)

## 2023-04-28 PROCEDURE — 83690 ASSAY OF LIPASE: CPT | Performed by: EMERGENCY MEDICINE

## 2023-04-28 PROCEDURE — 74177 CT ABD & PELVIS W/CONTRAST: CPT

## 2023-04-28 PROCEDURE — 99285 EMERGENCY DEPT VISIT HI MDM: CPT | Mod: 25

## 2023-04-28 PROCEDURE — 80053 COMPREHEN METABOLIC PANEL: CPT | Performed by: EMERGENCY MEDICINE

## 2023-04-28 PROCEDURE — 250N000011 HC RX IP 250 OP 636: Performed by: EMERGENCY MEDICINE

## 2023-04-28 PROCEDURE — 85004 AUTOMATED DIFF WBC COUNT: CPT | Performed by: EMERGENCY MEDICINE

## 2023-04-28 PROCEDURE — 36415 COLL VENOUS BLD VENIPUNCTURE: CPT | Performed by: EMERGENCY MEDICINE

## 2023-04-28 RX ORDER — ONDANSETRON 4 MG/1
4 TABLET, ORALLY DISINTEGRATING ORAL EVERY 8 HOURS PRN
Qty: 10 TABLET | Refills: 0 | Status: SHIPPED | OUTPATIENT
Start: 2023-04-28 | End: 2023-05-01

## 2023-04-28 RX ORDER — HYDROCODONE BITARTRATE AND ACETAMINOPHEN 5; 325 MG/1; MG/1
1 TABLET ORAL EVERY 6 HOURS PRN
Qty: 18 TABLET | Refills: 0 | Status: SHIPPED | OUTPATIENT
Start: 2023-04-28 | End: 2023-05-01

## 2023-04-28 RX ORDER — IOPAMIDOL 755 MG/ML
100 INJECTION, SOLUTION INTRAVASCULAR ONCE
Status: COMPLETED | OUTPATIENT
Start: 2023-04-28 | End: 2023-04-28

## 2023-04-28 RX ADMIN — IOPAMIDOL 100 ML: 755 INJECTION, SOLUTION INTRAVENOUS at 06:02

## 2023-04-28 ASSESSMENT — ENCOUNTER SYMPTOMS
DIARRHEA: 0
ABDOMINAL PAIN: 1
DYSURIA: 0
HEMATURIA: 0
VOMITING: 0
NAUSEA: 0
FEVER: 0

## 2023-04-28 NOTE — Clinical Note
Jignesh Hernandes was seen and treated in our emergency department on 4/28/2023.  He may return to work on 05/01/2023.       If you have any questions or concerns, please don't hesitate to call.      Tay Castro MD

## 2023-04-28 NOTE — ED PROVIDER NOTES
History     Chief Complaint:  Abdominal Pain       HPI   Jignesh Hernandes is a 37 year old male who presents with right sided abdominal pain. He explains that his pain began yesterday and has remained constant since onset. He remarks it seems to be worse when laying down and is accompanied with a dry mouth. He denies nausea, vomiting, diarrhea, or fever. No hematuria or dysuria. No rash. He denies any history of abdominal surgery or nephrolithiasis. Reports no other medical problems.     Independent Historian:   None - Patient Only    ROS:  Review of Systems   Constitutional: Negative for fever.   HENT:        (+) Dry mouth   Gastrointestinal: Positive for abdominal pain. Negative for diarrhea, nausea and vomiting.   Genitourinary: Negative for dysuria and hematuria.   Skin: Negative for rash.   All other systems reviewed and are negative.    Allergies:  Amoxicillin     Medications:    Albuterol  Celexa  Antabuse  Ativan  Remeron    Past Medical History:    GERD  Uncomplicated asthma  Orthostatic dizziness  Allergic rhinitis  Severe recurrent major depression without psychotic features    Past Surgical History:    History reviewed. No past surgical history.      Family History:    Father - Parkinsonism  Mother - anxiety, bipolar disorder    Social History:  Presents to the ED alone.   PCP: Clinic, [facility]     Physical Exam     Patient Vitals for the past 24 hrs:   BP Temp Pulse Resp SpO2 Weight   04/28/23 0637 -- -- -- -- 94 % --   04/28/23 0636 138/80 -- 83 -- -- --   04/28/23 0504 (!) 154/96 98.5  F (36.9  C) 86 18 98 % 95.3 kg (210 lb)     Physical Exam  General: Patient is awake, alert and interactive when I enter the room. Appears uncomfortable.   Head: The scalp, face, and head appear normal  Eyes: Conjunctivae and sclerae are normal  Neck: Normal range of motion.   CV: Regular rate.   Resp:  No respiratory distress.   GI: right sided tenderness but abdomen is soft, no rigidity. No evidence of  pulsatile mass. No fluid waves or evidence of ascites. No distension. No hernias or bruising are noted in detailed exam. No CVA tenderness.    MS: Normal tone.   Skin: Normal capillary refill noted  Neuro: Speech is normal and fluent. Face is symmetric. Moving all extremities.   Psych:  Normal affect.  Appropriate interactions.    Emergency Department Course     Imaging:  CT Abdomen Pelvis w Contrast   Final Result   IMPRESSION:    1.  Shotty lymph nodes are seen in the right lower quadrant mesentery. These are nonspecific. Clinical correlation for mesenteric adenitis.      2.  Appendix visualized and appears within normal limits.      3.  Colonic diverticula without evidence for diverticulitis.      4.  CT of the abdomen and pelvis otherwise unremarkable.                              Report per radiology    Laboratory:  Labs Ordered and Resulted from Time of ED Arrival to Time of ED Departure   COMPREHENSIVE METABOLIC PANEL - Abnormal       Result Value    Sodium 139      Potassium 4.1      Chloride 103      Carbon Dioxide (CO2) 27      Anion Gap 9      Urea Nitrogen 14.1      Creatinine 0.94      Calcium 9.5      Glucose 120 (*)     Alkaline Phosphatase 82      AST 44      ALT 59 (*)     Protein Total 7.5      Albumin 4.4      Bilirubin Total 0.4      GFR Estimate >90     LIPASE - Normal    Lipase 28     CBC WITH PLATELETS AND DIFFERENTIAL    WBC Count 5.3      RBC Count 5.28      Hemoglobin 15.3      Hematocrit 45.7      MCV 87      MCH 29.0      MCHC 33.5      RDW 12.8      Platelet Count 224      % Neutrophils 50      % Lymphocytes 34      % Monocytes 8      % Eosinophils 7      % Basophils 1      % Immature Granulocytes 0      NRBCs per 100 WBC 0      Absolute Neutrophils 2.7      Absolute Lymphocytes 1.8      Absolute Monocytes 0.4      Absolute Eosinophils 0.4      Absolute Basophils 0.0      Absolute Immature Granulocytes 0.0      Absolute NRBCs 0.0     ROUTINE UA WITH MICROSCOPIC REFLEX TO CULTURE      Emergency Department Course & Assessments:  Interventions:  Medications   iopamidol (ISOVUE-370) solution 100 mL (100 mLs Intravenous $Given 4/28/23 0602)   sodium chloride (PF) 0.9% PF flush 65 mL (65 mLs Intravenous $Given 4/28/23 0603)     Assessments:  0519 I obtained history and examined the patient as noted above.   0629 I rechecked the patient and explained findings. I believe that they are safe for discharge at this time.       Consultations/Discussion of Management or Tests:  None     Disposition:  The patient was discharged to home.     Impression & Plan    CMS Diagnoses: None    Medical Decision Making:  Patient is a 37-year-old gentleman who presents emergency department with right-sided abdominal pain.  He is tender in the right lower quadrant therefore CT scan was obtained to rule out appendicitis.  CT scan reveals evidence concerning for mesenteric adenitis.  These findings were discussed with the patient.  We will send him home with pain medication.  Can return the emergency department with any new or worsening symptoms.    Diagnosis:    ICD-10-CM    1. Mesenteric adenitis  I88.0         Discharge Medications:  Discharge Medication List as of 4/28/2023  6:38 AM      START taking these medications    Details   HYDROcodone-acetaminophen (NORCO) 5-325 MG tablet Take 1 tablet by mouth every 6 hours as needed for pain, Disp-18 tablet, R-0, Local Print      ondansetron (ZOFRAN ODT) 4 MG ODT tab Take 1 tablet (4 mg) by mouth every 8 hours as needed for nausea, Disp-10 tablet, R-0, Local Print           Scribe Disclosure:  I, Chris Chan, am serving as a scribe at 5:20 AM on 4/28/2023 to document services personally performed by Tay Castro MD based on my observations and the provider's statements to me.   4/28/2023   Tay Castro MD Battista, Christopher Joseph, MD  04/28/23 2156

## 2023-04-28 NOTE — DISCHARGE INSTRUCTIONS

## 2023-04-28 NOTE — ED TRIAGE NOTES
RUQ abdominal pain starting yesterday 0800, kept from sleeping all night. No history of gallstones. Denies fevers, N/V/D. Eating and drinking normally. Did not take anything for pain.

## 2024-05-26 ENCOUNTER — HEALTH MAINTENANCE LETTER (OUTPATIENT)
Age: 39
End: 2024-05-26

## 2025-02-02 NOTE — ED TRIAGE NOTES
Patient presents with complaints of chest pain and SOB for the past week. Patient states that it has  been getting worse. Patient was seen at clinic and had a normal EKG. He was told to come to ER for further evaluation.. ABC intact without need for intervention at this time.      pt calm and cooperative. resting comfortably.

## 2025-06-14 ENCOUNTER — HEALTH MAINTENANCE LETTER (OUTPATIENT)
Age: 40
End: 2025-06-14